# Patient Record
Sex: FEMALE | Race: BLACK OR AFRICAN AMERICAN | NOT HISPANIC OR LATINO | Employment: FULL TIME | URBAN - METROPOLITAN AREA
[De-identification: names, ages, dates, MRNs, and addresses within clinical notes are randomized per-mention and may not be internally consistent; named-entity substitution may affect disease eponyms.]

---

## 2023-11-09 ENCOUNTER — OFFICE VISIT (OUTPATIENT)
Dept: OBSTETRICS AND GYNECOLOGY | Facility: CLINIC | Age: 48
End: 2023-11-09
Payer: COMMERCIAL

## 2023-11-09 DIAGNOSIS — N76.0 ACUTE VAGINITIS: ICD-10-CM

## 2023-11-09 DIAGNOSIS — Z00.00 HEALTHCARE MAINTENANCE: Primary | ICD-10-CM

## 2023-11-09 PROCEDURE — 99214 OFFICE O/P EST MOD 30 MIN: CPT | Performed by: OBSTETRICS & GYNECOLOGY

## 2023-11-09 RX ORDER — TERCONAZOLE 8 MG/G
1 CREAM VAGINAL NIGHTLY
Qty: 20 G | Refills: 2 | Status: SHIPPED | OUTPATIENT
Start: 2023-11-09 | End: 2023-11-12

## 2023-11-09 RX ORDER — TERCONAZOLE 8 MG/G
1 CREAM VAGINAL NIGHTLY
Qty: 20 G | Refills: 0 | Status: SHIPPED | OUTPATIENT
Start: 2023-11-09 | End: 2023-11-09 | Stop reason: SDUPTHER

## 2023-11-09 ASSESSMENT — ENCOUNTER SYMPTOMS
ALLERGIC/IMMUNOLOGIC NEGATIVE: 0
MUSCULOSKELETAL NEGATIVE: 0
ENDOCRINE NEGATIVE: 0
GASTROINTESTINAL NEGATIVE: 0
PSYCHIATRIC NEGATIVE: 0
NEUROLOGICAL NEGATIVE: 0
CARDIOVASCULAR NEGATIVE: 0
CONSTITUTIONAL NEGATIVE: 0
HEMATOLOGIC/LYMPHATIC NEGATIVE: 0
EYES NEGATIVE: 0
RESPIRATORY NEGATIVE: 0

## 2023-11-09 NOTE — PROGRESS NOTES
Subjective   Patient ID: David Manrique is a 47 y.o. female who presents for Vaginal Discharge (Patient here for vaginal discharge,last pap  wnl HPV negative, no chaperone needed).  Vaginal Discharge  The patient's primary symptoms include vaginal discharge.     Patient is a 47-year-old female  3 para 2 new to the office here for evaluation of vaginal yeast.  Chronic.  Previously saw Dr. Yassine Kaba and Dr. Brar  She denies any urinary or bowel symptoms.  Review of Systems   Genitourinary:  Positive for vaginal discharge.       Objective   Physical Exam  Pelvic exam: External genitalia Bartholin's urethra and [place]'s normal.  Vaginal vault with watery white discharge.  Cervix not friable.  On bimanual exam the uterus is small mobile nontender no adnexal masses    Wet prep significant for yeast forms.  Negative clue cells and negative trichomonads  Assessment/Plan   Yeast vaginitis chronic.  We will treat with Terazol  Encouraged use of probiotics  Follow-up in 2months for annual exam

## 2023-11-09 NOTE — PROGRESS NOTES
Subjective   Patient ID: David Manrique is a 47 y.o. female who presents for Vaginal Discharge (Patient here for vaginal discharge,last pap  wnl HPV negative, no chaperone needed).  HPI  Patient is a 47-year-old female  3 para 2 here for an initial visit and evaluation of chronic yeast vaginitis.  Previously seen by Dr. Yasisne Kaba.  And Doctor Zonia.  Chronic yeast vaginitis.  Treated mostly with topicals as she had fever blisters from the Diflucan.  She denies any urinary or bowel symptoms.  Her last Pap smear was in  benign  Review of Systems    Objective   Physical Exam  Pelvic exam: External genitalia Bartholin's urethra and [place]'s normal.  Vaginal vault with thin white discharge.  On bimanual exam the uterus is small mobile nontender no adnexal masses    Wet prep performed with no clue cells.  Few yeast forms.  No trichomonas  Assessment/Plan   Yeast vaginitis we will treat with Terazol.  Refills given  Encouraged her to begin probiotics.  Follow-up for annual exam in 3 to 4 months       Subjective   David Manrique is a 47 y.o. female who complains of vaginal discharge/itch.    HPI:  Symptoms reported: {symptoms:02808}  Onset: {onset:64366}  Course: {Desc; intermittent/persistent/constant:95391}  Progression: {resolved/improved/worsened:12359}    Helpful treatments: {home treatments:73679}  Unhelpful treatments tried: {home treatments:33600}    Objective   Physical Exam:   General Appearance: {Exam; general:01066}  Abdomen: {Exam; abdomen:22113}  Pelvic Exam: {exam; pelvic:70469}  Urine dipstick: {:5113}.    Assessment/Plan   Diagnoses and all orders for this visit:  Healthcare maintenance  -     Follow Up In Gynecology; Future  -     terconazole (Terazol 3) 0.8 % vaginal cream; Insert 1 applicator into the vagina once daily at bedtime for 3 days.  Acute vaginitis  -     Vaginitis Gram Stain For Bacterial Vaginosis + Yeast; Future

## 2024-01-17 ENCOUNTER — APPOINTMENT (OUTPATIENT)
Dept: PRIMARY CARE | Facility: CLINIC | Age: 49
End: 2024-01-17
Payer: COMMERCIAL

## 2024-01-18 ENCOUNTER — OFFICE VISIT (OUTPATIENT)
Dept: OBSTETRICS AND GYNECOLOGY | Facility: CLINIC | Age: 49
End: 2024-01-18
Payer: COMMERCIAL

## 2024-01-18 VITALS
SYSTOLIC BLOOD PRESSURE: 115 MMHG | HEIGHT: 64 IN | WEIGHT: 190 LBS | BODY MASS INDEX: 32.44 KG/M2 | DIASTOLIC BLOOD PRESSURE: 74 MMHG

## 2024-01-18 DIAGNOSIS — Z78.0 MENOPAUSE: ICD-10-CM

## 2024-01-18 DIAGNOSIS — Z12.31 ENCOUNTER FOR SCREENING MAMMOGRAM FOR BREAST CANCER: ICD-10-CM

## 2024-01-18 DIAGNOSIS — Z00.00 ENCOUNTER FOR ANNUAL PHYSICAL EXAMINATION EXCLUDING GYNECOLOGICAL EXAMINATION IN A PATIENT OLDER THAN 17 YEARS: Primary | ICD-10-CM

## 2024-01-18 LAB
POC BLOOD, URINE: NEGATIVE
POC GLUCOSE, URINE: NEGATIVE MG/DL
POC KETONES, URINE: NEGATIVE MG/DL
POC LEUKOCYTES, URINE: NEGATIVE
POC NITRITE,URINE: NEGATIVE
POC PROTEIN, URINE: ABNORMAL MG/DL

## 2024-01-18 PROCEDURE — 99396 PREV VISIT EST AGE 40-64: CPT | Performed by: OBSTETRICS & GYNECOLOGY

## 2024-01-18 PROCEDURE — 81003 URINALYSIS AUTO W/O SCOPE: CPT | Performed by: OBSTETRICS & GYNECOLOGY

## 2024-01-18 RX ORDER — ESTRADIOL 1 MG/1
1 TABLET ORAL DAILY
Qty: 30 TABLET | Refills: 1 | Status: SHIPPED | OUTPATIENT
Start: 2024-01-18 | End: 2024-02-19 | Stop reason: SDUPTHER

## 2024-01-18 ASSESSMENT — ENCOUNTER SYMPTOMS
RESPIRATORY NEGATIVE: 0
HEMATOLOGIC/LYMPHATIC NEGATIVE: 0
CONSTITUTIONAL NEGATIVE: 0
LOSS OF SENSATION IN FEET: 0
MUSCULOSKELETAL NEGATIVE: 0
GASTROINTESTINAL NEGATIVE: 0
ALLERGIC/IMMUNOLOGIC NEGATIVE: 0
EYES NEGATIVE: 0
NEUROLOGICAL NEGATIVE: 0
ENDOCRINE NEGATIVE: 0
OCCASIONAL FEELINGS OF UNSTEADINESS: 0
PSYCHIATRIC NEGATIVE: 0
CARDIOVASCULAR NEGATIVE: 0
DEPRESSION: 0

## 2024-01-18 ASSESSMENT — LIFESTYLE VARIABLES
SKIP TO QUESTIONS 9-10: 0
HOW MANY STANDARD DRINKS CONTAINING ALCOHOL DO YOU HAVE ON A TYPICAL DAY: 1 OR 2
HOW OFTEN DO YOU HAVE SIX OR MORE DRINKS ON ONE OCCASION: LESS THAN MONTHLY
AUDIT-C TOTAL SCORE: 2
HOW OFTEN DO YOU HAVE A DRINK CONTAINING ALCOHOL: MONTHLY OR LESS

## 2024-01-18 ASSESSMENT — PAIN SCALES - GENERAL: PAINLEVEL: 0-NO PAIN

## 2024-01-18 ASSESSMENT — PATIENT HEALTH QUESTIONNAIRE - PHQ9
SUM OF ALL RESPONSES TO PHQ9 QUESTIONS 1 & 2: 0
1. LITTLE INTEREST OR PLEASURE IN DOING THINGS: NOT AT ALL
2. FEELING DOWN, DEPRESSED OR HOPELESS: NOT AT ALL

## 2024-01-18 NOTE — PROGRESS NOTES
Subjective   Patient ID: David Manrique is a 48 y.o. female who presents for Annual Exam (Annual exam last pap 22 wnl HPV negative last mammogram 10/13/22 benign no chaperone needed).  HPI  Patient is a 48-year-old female  3 para 2 known to the practice here for her annual exam.  She denies any urinary or bowel symptoms.  Does complain of occasional hot flashes poor sleep and irritability.    She also has a family history of breast cancer in 2 maternal aunts and 1 paternal aunt with ovarian cancer.  Review of Systems   All other systems reviewed and are negative.      Objective   Physical Exam  Thyroid: No thyroid megaly    Cardiovascular: Regular rate and rhythm    Lungs: Clear to auscultation    Breasts: No skin changes no masses palpated    Abdomen: Soft nontender bowel sounds positive no masses palpated    Extremities nontender no edema    Pelvic exam: External genitalia Bartholin's urethra and [place]'s are normal.  Vaginal exam shows no lesions or discharge.  Pelvic bimanual exam reveals no masses or tenderness.  Assessment/Plan     Normal annual physical exam  Menopausal symptoms affecting daily living and irritability.  Previously was on estradiol and would like to try again.  Provided her a 30-day prescription with 1 refill and she is due for follow-up virtually to reassess.  Also discussed history and multiple aunts of breast cancer and 1 ovarian cancer.  We have ordered BRCA testing.         Elie Stokes MD 24 3:40 PM

## 2024-01-25 ENCOUNTER — OFFICE VISIT (OUTPATIENT)
Dept: PRIMARY CARE | Facility: CLINIC | Age: 49
End: 2024-01-25
Payer: COMMERCIAL

## 2024-01-25 VITALS
WEIGHT: 190 LBS | HEART RATE: 88 BPM | SYSTOLIC BLOOD PRESSURE: 107 MMHG | BODY MASS INDEX: 32.44 KG/M2 | HEIGHT: 64 IN | DIASTOLIC BLOOD PRESSURE: 74 MMHG | OXYGEN SATURATION: 94 %

## 2024-01-25 DIAGNOSIS — Z13.6 SCREENING FOR HEART DISEASE: Primary | ICD-10-CM

## 2024-01-25 DIAGNOSIS — Z00.00 HEALTH CARE MAINTENANCE: ICD-10-CM

## 2024-01-25 DIAGNOSIS — Z23 NEED FOR SHINGLES VACCINE: ICD-10-CM

## 2024-01-25 DIAGNOSIS — F32.A DEPRESSION, UNSPECIFIED DEPRESSION TYPE: ICD-10-CM

## 2024-01-25 DIAGNOSIS — Z23 NEED FOR PROPHYLACTIC VACCINATION AGAINST DIPHTHERIA AND TETANUS: ICD-10-CM

## 2024-01-25 DIAGNOSIS — Z12.11 SCREEN FOR COLON CANCER: ICD-10-CM

## 2024-01-25 PROBLEM — E55.9 VITAMIN D INSUFFICIENCY: Status: ACTIVE | Noted: 2024-01-25

## 2024-01-25 LAB
ALBUMIN SERPL BCP-MCNC: 4.5 G/DL (ref 3.4–5)
ALP SERPL-CCNC: 68 U/L (ref 33–110)
ALT SERPL W P-5'-P-CCNC: 33 U/L (ref 7–45)
ANION GAP SERPL CALC-SCNC: 14 MMOL/L (ref 10–20)
AST SERPL W P-5'-P-CCNC: 22 U/L (ref 9–39)
BILIRUB SERPL-MCNC: 0.8 MG/DL (ref 0–1.2)
BUN SERPL-MCNC: 10 MG/DL (ref 6–23)
CALCIUM SERPL-MCNC: 10.2 MG/DL (ref 8.6–10.6)
CHLORIDE SERPL-SCNC: 104 MMOL/L (ref 98–107)
CHOLEST SERPL-MCNC: 137 MG/DL (ref 0–199)
CHOLESTEROL/HDL RATIO: 2.5
CO2 SERPL-SCNC: 29 MMOL/L (ref 21–32)
CREAT SERPL-MCNC: 0.75 MG/DL (ref 0.5–1.05)
EGFRCR SERPLBLD CKD-EPI 2021: >90 ML/MIN/1.73M*2
ERYTHROCYTE [DISTWIDTH] IN BLOOD BY AUTOMATED COUNT: 13.8 % (ref 11.5–14.5)
GLUCOSE SERPL-MCNC: 102 MG/DL (ref 74–99)
HCT VFR BLD AUTO: 45.7 % (ref 36–46)
HDLC SERPL-MCNC: 55.1 MG/DL
HGB BLD-MCNC: 14 G/DL (ref 12–16)
LDLC SERPL CALC-MCNC: 71 MG/DL
MCH RBC QN AUTO: 27 PG (ref 26–34)
MCHC RBC AUTO-ENTMCNC: 30.6 G/DL (ref 32–36)
MCV RBC AUTO: 88 FL (ref 80–100)
NON HDL CHOLESTEROL: 82 MG/DL (ref 0–149)
NRBC BLD-RTO: 0 /100 WBCS (ref 0–0)
PLATELET # BLD AUTO: 202 X10*3/UL (ref 150–450)
POTASSIUM SERPL-SCNC: 4.8 MMOL/L (ref 3.5–5.3)
PROT SERPL-MCNC: 7.4 G/DL (ref 6.4–8.2)
RBC # BLD AUTO: 5.18 X10*6/UL (ref 4–5.2)
SODIUM SERPL-SCNC: 142 MMOL/L (ref 136–145)
TRIGL SERPL-MCNC: 53 MG/DL (ref 0–149)
TSH SERPL-ACNC: 0.76 MIU/L (ref 0.44–3.98)
VLDL: 11 MG/DL (ref 0–40)
WBC # BLD AUTO: 4.6 X10*3/UL (ref 4.4–11.3)

## 2024-01-25 PROCEDURE — 85027 COMPLETE CBC AUTOMATED: CPT

## 2024-01-25 PROCEDURE — 1036F TOBACCO NON-USER: CPT | Performed by: INTERNAL MEDICINE

## 2024-01-25 PROCEDURE — 80061 LIPID PANEL: CPT

## 2024-01-25 PROCEDURE — 36415 COLL VENOUS BLD VENIPUNCTURE: CPT

## 2024-01-25 PROCEDURE — 84443 ASSAY THYROID STIM HORMONE: CPT

## 2024-01-25 PROCEDURE — 99396 PREV VISIT EST AGE 40-64: CPT | Performed by: INTERNAL MEDICINE

## 2024-01-25 PROCEDURE — 80053 COMPREHEN METABOLIC PANEL: CPT

## 2024-01-25 PROCEDURE — 90715 TDAP VACCINE 7 YRS/> IM: CPT | Performed by: INTERNAL MEDICINE

## 2024-01-25 PROCEDURE — 90750 HZV VACC RECOMBINANT IM: CPT | Performed by: INTERNAL MEDICINE

## 2024-01-25 PROCEDURE — 90471 IMMUNIZATION ADMIN: CPT | Performed by: INTERNAL MEDICINE

## 2024-01-25 PROCEDURE — 90472 IMMUNIZATION ADMIN EACH ADD: CPT | Performed by: INTERNAL MEDICINE

## 2024-01-25 RX ORDER — MINERAL OIL
1 ENEMA (ML) RECTAL DAILY
COMMUNITY

## 2024-01-25 RX ORDER — CHOLECALCIFEROL (VITAMIN D3) 25 MCG
1000 TABLET ORAL DAILY
COMMUNITY

## 2024-01-25 ASSESSMENT — PROMIS GLOBAL HEALTH SCALE
CARRYOUT_PHYSICAL_ACTIVITIES: MOSTLY
RATE_AVERAGE_FATIGUE: MODERATE
RATE_QUALITY_OF_LIFE: VERY GOOD
RATE_MENTAL_HEALTH: EXCELLENT
RATE_GENERAL_HEALTH: VERY GOOD
EMOTIONAL_PROBLEMS: RARELY
RATE_SOCIAL_SATISFACTION: EXCELLENT
RATE_AVERAGE_PAIN: 4
RATE_PHYSICAL_HEALTH: GOOD
CARRYOUT_SOCIAL_ACTIVITIES: EXCELLENT

## 2024-01-25 ASSESSMENT — PATIENT HEALTH QUESTIONNAIRE - PHQ9
2. FEELING DOWN, DEPRESSED OR HOPELESS: NOT AT ALL
SUM OF ALL RESPONSES TO PHQ9 QUESTIONS 1 AND 2: 0
1. LITTLE INTEREST OR PLEASURE IN DOING THINGS: NOT AT ALL

## 2024-01-25 NOTE — PROGRESS NOTES
"Subjective   Patient ID: David Manrique is a 48 y.o. female who presents for Annual Exam.        HPI     Patient is a 56-year-old female with past medical history of depression who presents for wellness.  Patient follows with gynecology and is up-to-date on screenings.  She has perimenopausal symptoms and currently on estrogen replacement therapy.  She has an appointment to discuss further with the gynecologist in a month.  She lives at home with her .  She has a children out of the house.  She has a  and exercises quite regularly.  She has gained 5 pounds since last visit.    No complaints at this time.    Review of Systems  Constitutional: No fever or chills, No Night Sweats  Eyes: No Blurry Vision or Eye sight problems  ENT: No Nasal Discharge, Hoarseness, sore throat  Cardiovascular: no chest pain, no palpitations and no syncope.   Respiratory: no cough, no shortness of breath during exertion and no shortness of breath at rest.   Gastrointestinal: no abdominal pain, no nausea and no vomiting.   : No vaginal discharge, burning with urination, no blood in urine or stools  Skin: No Skin rashes or Lesions  Neuro: No Headache, no dizziness or Numbness or tingling  Psych: No Anxiety, depression or sleeping problems  Heme: No Easy bleeding or brusing.     Objective   /74   Pulse 88   Ht 1.626 m (5' 4\")   Wt 86.2 kg (190 lb)   LMP  (LMP Unknown)   SpO2 94%   BMI 32.61 kg/m²     Physical Exam  Patient declined Chaperone  Constitutional: Alert and in no acute distress. Well developed, well nourished.   Head and Face: Head and face: Normal.    Eyes: Normal external exam. Pupils were equal in size, round, reactive to light (PERRL) with normal accommodation and extraocular movements intact (EOMI).   Ears, Nose, Mouth, and Throat: External inspection of ears and nose: Normal.  Hearing: Normal.  Nasal mucosa, septum, and turbinates: Normal.  Lips, teeth, and gums: Normal.  Oropharynx: " Normal.   Neck: No neck mass was observed. Supple. Thyroid not enlarged and there were no palpable thyroid nodules.   Cardiovascular: Heart rate and rhythm were normal, normal S1 and S2. Pedal pulses: Normal. No peripheral edema.   Pulmonary: No respiratory distress. Clear bilateral breath sounds.   Breast: Normal Appearance, No Masses or lumps palpated  Abdomen: Soft nontender; no abdominal mass palpated. Normal bowel sounds. No organomegaly.   : Deferred   Musculoskeletal: No joint swelling seen, normal movements of all extremities. Range of motion: Normal.  Muscle strength/tone: Normal.    Skin: Normal skin color and pigmentation, normal skin turgor, and no rash.   Neurologic: Deep tendon reflexes were 2+ and symmetric.   Psychiatric: Judgment and insight: Intact. Mood and affect: Normal.  Lymphatic: No cervical lymphadenopathy. Palpation of lymph nodes in axillae: Normal.  Palpation of lymph nodes in groin: Normal.    Lab Results   Component Value Date    WBC 3.9 (L) 09/27/2022    HGB 14.5 09/27/2022    HCT 46.7 (H) 09/27/2022     09/27/2022    CHOL 175 09/27/2022    TRIG 80 09/27/2022    HDL 57.2 09/27/2022    ALT 24 09/27/2022    AST 17 09/27/2022     09/27/2022    K 4.1 09/27/2022     09/27/2022    CREATININE 0.70 09/27/2022    BUN 8 09/27/2022    CO2 28 09/27/2022    TSH 0.95 09/27/2022    INR 1.1 11/08/2020    HGBA1C 6.3 (A) 09/27/2022       MR elbow  MRN: [id number]  Patient Name: ROBERTO LAND     STUDY:  MRI of the  left elbow  without IV contrast;  11/9/2022 9:05 am     INDICATION:  epicondylitis  M77.12: Lateral epicondylitis of left elbow.     COMPARISON:  Left elbow radiographs 05/23/2022.     ACCESSION NUMBER(S):  [id number]     ORDERING CLINICIAN:  BEV TINOCO     TECHNIQUE:  MR imaging of the  left elbow was obtained  without IV contrast.     FINDINGS:  TENDONS:  There is mild thickening and surrounding edema of the common extensor  tendon. The biceps, triceps,  brachialis tendons as well as the common  flexor tendons are intact.     LIGAMENTS:  The major ligaments of the elbow are intact, including the ulnar  collateral, lateral ulnar collateral, and radial collateral ligaments.     JOINTS:  There is no dislocation. The articular cartilage is intact. There is  no osteochondral defect.     There is no joint effusion. There is no olecranon bursitis.     OSSEOUS STRUCTURES:  There is small oblong T2 hyperintense cystic changes within the  subchondral capitellum. There is no fracture or contusion.     SOFT TISSUES:  Musculature is normal without tear or atrophy.     The ulnar nerve is normal. The cubital tunnel is intact.     IMPRESSION:  1. Mild lateral epicondylitis.  2. Small oblong T2 hyperintense cystic changes in the capitellum,  likely subchondral cyst formation.     I personally reviewed the images/study and I agree with the findings  as stated. This study was interpreted at [facility], [place], [place].      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     Other Visit Diagnoses         Codes    Screening for heart disease    -  Primary Z13.6    Relevant Orders    CT cardiac scoring wo IV contrast    Health care maintenance     Z00.00    Relevant Orders    CBC    Comprehensive metabolic panel    Lipid Panel    TSH with reflex to Free T4 if abnormal    Follow Up In [facility] - PCP - Health Maintenance    Need for shingles vaccine     Z23    Relevant Orders    Zoster vaccine, recombinant, adult (SHINGRIX)    Need for prophylactic vaccination against diphtheria and tetanus     Z23    Relevant Orders    Tdap vaccine, age 7 years and older    Screen for colon cancer     Z12.11    Relevant Orders    Colonoscopy Screening; Average Risk Patient               Dear David Manrique     It was my pleasure to take care of you today in the office. Below are the things we discussed today:    1. 1. Immunizations: Yearly Flu shot is recommended.          a: COVID: Up-to-Date         b: Tetanus: Ordered         c: Shingrix: Ordered.  Repeat 2 months      2. Blood Work: Ordered  3. Seen your dentist twice a year  4. Yearly Eye exam is recommended    5. BMI: 32.6  6: Diet recommendations:   Eat Clean, Try to have as many home cooked meals as possible  Avoid processed foods which contain excess calories, sugar, and sodium.    7. Exercise recommendations:   150 minutes a week to maintain your weight     If you have to loose weight, you need a better diet and exercise plan.     8. Supplements recommended:  a - Calcium 600 mg up to twice a day to get a total of 1200 mg. Each 8 oz of milk or yogurt or 1 oz of cheese, 1 Banana, 1 serving of green Leafy vegetable has about 300 mg of Calcium, so you may subtract that amount. Calcium citrate is the only acceptable supplement to take if you take an acid suppressing medication like Prilosec; otherwise Calcium carbonate is acceptable too (It can cause Constipation).   b - Vitamin D - 2000 IU daily     9. Please get your Living will / Advance directive completed if you do not have one already. Please make sure our office has a copy of the latest one.     10. Colonoscopy: Ordered  11. Mammogram: Scheduled  12. PAP: Up-to-date  13. DEXA: N/A  14: Skin Check: Please see Dermatology once a year for a Skin Check.     15.    Follow up in one year for a Physical. Please call the office before your Physical to see if you need blood work completed prior to your physical.     Please call me if any questions arise from now until your next visit. I will call you after I am done seeing patients. A Doctor is always available by phone when the office is closed. Please feel free to call for help with any problem that you feel shouldn't wait until the  office re-opens.     Chidi Castañeda, DO

## 2024-01-26 ENCOUNTER — HOSPITAL ENCOUNTER (OUTPATIENT)
Dept: RADIOLOGY | Facility: CLINIC | Age: 49
Discharge: HOME | End: 2024-01-26
Payer: COMMERCIAL

## 2024-01-26 DIAGNOSIS — Z12.31 ENCOUNTER FOR SCREENING MAMMOGRAM FOR BREAST CANCER: ICD-10-CM

## 2024-01-26 PROCEDURE — 77063 BREAST TOMOSYNTHESIS BI: CPT | Performed by: RADIOLOGY

## 2024-01-26 PROCEDURE — 77067 SCR MAMMO BI INCL CAD: CPT

## 2024-01-26 PROCEDURE — 77067 SCR MAMMO BI INCL CAD: CPT | Performed by: RADIOLOGY

## 2024-01-30 ENCOUNTER — HOSPITAL ENCOUNTER (OUTPATIENT)
Dept: RADIOLOGY | Facility: HOSPITAL | Age: 49
Discharge: HOME | End: 2024-01-30
Payer: COMMERCIAL

## 2024-01-30 DIAGNOSIS — Z13.6 SCREENING FOR HEART DISEASE: ICD-10-CM

## 2024-01-30 PROCEDURE — 75571 CT HRT W/O DYE W/CA TEST: CPT

## 2024-02-19 ENCOUNTER — TELEMEDICINE (OUTPATIENT)
Dept: OBSTETRICS AND GYNECOLOGY | Facility: CLINIC | Age: 49
End: 2024-02-19
Payer: COMMERCIAL

## 2024-02-19 DIAGNOSIS — Z78.0 MENOPAUSE: ICD-10-CM

## 2024-02-19 PROCEDURE — 99213 OFFICE O/P EST LOW 20 MIN: CPT | Mod: 95 | Performed by: OBSTETRICS & GYNECOLOGY

## 2024-02-19 PROCEDURE — 99213 OFFICE O/P EST LOW 20 MIN: CPT | Performed by: OBSTETRICS & GYNECOLOGY

## 2024-02-19 RX ORDER — TERCONAZOLE 8 MG/G
1 CREAM VAGINAL NIGHTLY
Qty: 20 G | Refills: 2 | Status: SHIPPED | OUTPATIENT
Start: 2024-02-19 | End: 2024-02-22

## 2024-02-19 RX ORDER — ESTRADIOL 1 MG/1
1 TABLET ORAL DAILY
Qty: 90 TABLET | Refills: 3 | Status: SHIPPED | OUTPATIENT
Start: 2024-02-19 | End: 2025-02-18

## 2024-02-19 NOTE — PROGRESS NOTES
Subjective   Patient ID: David Manrique is a 48 y.o. female who presents for No chief complaint on file..  HPI  Patient calling for a telehealth visit to review hormone replacement therapy.  Previous hysterectomy and started on estradiol daily for menopausal symptoms and hot flashes.  Feels much improved and would like to continue.  Reassured her that without a uterus she had no risk for uterine cancer.  Did discuss the increased risk for DVT and then if for any reason she became sedentary may want to stop her estradiol.  She very much would like to continue it at this time    Also complains of recurrent yeast infection.  Has used over-the-counter medicine.  Would like some treatment    Also had ordered her BRCA testing which the paperwork was not filled out completely and she will return  exam deferred due to telehealth visitit for completeness  Review of Systems   All other systems reviewed and are negative.      Objective   Physical Exam    Assessment/Plan   Patient well on estrogen replacement therapy.    Diflucan for recurrent yeast    Follow-up to complete BRCA order sheet         Elie Stokes MD 02/19/24 3:53 PM

## 2024-07-29 ENCOUNTER — CLINICAL SUPPORT (OUTPATIENT)
Dept: PRIMARY CARE | Facility: CLINIC | Age: 49
End: 2024-07-29
Payer: COMMERCIAL

## 2024-07-29 PROCEDURE — 90471 IMMUNIZATION ADMIN: CPT | Performed by: INTERNAL MEDICINE

## 2024-07-29 PROCEDURE — 90750 HZV VACC RECOMBINANT IM: CPT | Performed by: INTERNAL MEDICINE

## 2024-10-07 ENCOUNTER — OFFICE VISIT (OUTPATIENT)
Dept: PRIMARY CARE | Facility: CLINIC | Age: 49
End: 2024-10-07
Payer: COMMERCIAL

## 2024-10-07 VITALS
BODY MASS INDEX: 32.27 KG/M2 | SYSTOLIC BLOOD PRESSURE: 127 MMHG | HEART RATE: 78 BPM | DIASTOLIC BLOOD PRESSURE: 73 MMHG | OXYGEN SATURATION: 96 % | WEIGHT: 188 LBS

## 2024-10-07 DIAGNOSIS — E66.9 OBESITY (BMI 30-39.9): ICD-10-CM

## 2024-10-07 DIAGNOSIS — R06.83 SNORING: ICD-10-CM

## 2024-10-07 DIAGNOSIS — M62.89 QUADRICEP TIGHTNESS: ICD-10-CM

## 2024-10-07 DIAGNOSIS — Z23 NEEDS FLU SHOT: ICD-10-CM

## 2024-10-07 DIAGNOSIS — R53.83 OTHER FATIGUE: Primary | ICD-10-CM

## 2024-10-07 PROCEDURE — 99214 OFFICE O/P EST MOD 30 MIN: CPT | Performed by: INTERNAL MEDICINE

## 2024-10-07 PROCEDURE — 1036F TOBACCO NON-USER: CPT | Performed by: INTERNAL MEDICINE

## 2024-10-07 PROCEDURE — 90656 IIV3 VACC NO PRSV 0.5 ML IM: CPT | Performed by: INTERNAL MEDICINE

## 2024-10-07 PROCEDURE — 90471 IMMUNIZATION ADMIN: CPT | Performed by: INTERNAL MEDICINE

## 2024-10-07 NOTE — PROGRESS NOTES
Subjective   Patient ID: David Manrique is a 48 y.o. female who presents for Leg Pain.    HPI     Patient is a 48-year-old female with past medical history of hot flashes who presents for follow-up.  She has been experiencing some right-sided knee pain that begins in the right hip and radiates down the lateral aspect of the right leg towards the knee.  She states that she has some tenderness in the bilateral thighs.  No swelling.  No muscle weakness.  She does have a  who believes it is related to the quads    She is also experiencing ongoing fatigue.  She has significant amount of snoring and obesity.  She states that she had a sleep study done previously but no record.        Review of Systems  Constitutional: No fever or chills  Cardiovascular: no chest pain, no palpitations and no syncope.   Respiratory: no cough, no shortness of breath during exertion and no shortness of breath at rest.   Gastrointestinal: no abdominal pain, no nausea and no vomiting.  Neuro: No Headache, no dizziness    Objective   /73   Pulse 78   Wt 85.3 kg (188 lb)   SpO2 96%   BMI 32.27 kg/m²     Physical Exam  Constitutional: Alert and in no acute distress. Well developed, well nourished  Head and Face: Head and face: Normal.    Cardiovascular: Heart rate and rhythm were normal, normal S1 and S2. No peripheral edema.   Pulmonary: No respiratory distress. Clear bilateral breath sounds.  Musculoskeletal: Gait and station: Normal. Muscle strength/tone: Normal.   Skin: Normal skin color and pigmentation, normal skin turgor, and no rash.    Psychiatric: Judgment and insight: Intact. Mood and affect: Normal.    Procedures    Lab Results   Component Value Date    WBC 4.6 01/25/2024    HGB 14.0 01/25/2024    HCT 45.7 01/25/2024     01/25/2024    CHOL 137 01/25/2024    TRIG 53 01/25/2024    HDL 55.1 01/25/2024    ALT 33 01/25/2024    AST 22 01/25/2024     01/25/2024    K 4.8 01/25/2024     01/25/2024  "   CREATININE 0.75 01/25/2024    BUN 10 01/25/2024    CO2 29 01/25/2024    TSH 0.76 01/25/2024    INR 1.1 11/08/2020    HGBA1C 6.3 (A) 09/27/2022       CT cardiac scoring wo IV contrast  Narrative: Interpreted By:  Rosey Daniels,   STUDY:  CT CARDIAC SCORING WO IV CONTRAST;  1/30/2024 8:42 am      INDICATION:  Signs/Symptoms:screen cad.      COMPARISON:  11/08/2020      ACCESSION NUMBER(S):  [id number]      ORDERING CLINICIAN:  ZHANNA HARGROVE      TECHNIQUE:  Using prospective ECG gating, CT scan of the coronary arteries was  performed without intravenous contrast. Coronary calcium scoring  was  performed according to the method of Agatston.      FINDINGS:  The score and distribution of calcium in the coronary arteries is as  follows:      LM ,  LAD ,  LCx ,  RCA ,      Total 0      The visualized mid/lower ascending thoracic aorta measures 2.8 cm in  diameter. The heart is normal in size. No pericardial effusion is  present.      No gross evidence of mediastinal or hilar lymphadenopathy or masses  is identified. The visualized segments of the lungs are normally  expanded.      The visualized subdiaphragmatic structures appear intact.      Impression: 1. Coronary artery calcium score of 0*.      *Coronary artery calcium scoring may be helpful in predicting the  risk for future coronary heart disease events.  According to the  American College [employer] Foundation Clinical Expert Consensus  Task Force, such testing provides important prognostic information in  patients with more than one coronary heart disease risk factor. The  coronary artery calcium score correlates with the annual risk of a  non-fatal myocardial infarction or coronary heart disease death.      Coronary artery score            Annual Risk      0-99                             0.4%  100-399                        1.3%  >400                            2.4%      These three \"breakpoints\" correspond to lower, intermediate and high  risk states for " future coronary events.  Such information should be  used, along with appropriate clinical judgment, to make decisions  regarding the intensity of risk factor management strategies to treat  blood lipids and to modify other non-lipid coronary risk factors.      Reference: Steve P et al. Circulation.  2007; 115:402-426      MACRO:  None      Signed by: Rosey Daniels 1/30/2024 9:22 AM  Dictation workstation:   [id number]            Assessment/Plan   Problem List Items Addressed This Visit    None  Visit Diagnoses         Codes    Other fatigue    -  Primary R53.83    Relevant Orders    Home sleep apnea test (HSAT)    Snoring     R06.83    Relevant Orders    Home sleep apnea test (HSAT)    Obesity (BMI 30-39.9)     E66.9    Relevant Orders    Home sleep apnea test (HSAT)    Quadricep tightness     M62.89    Relevant Orders    Referral to Physical Therapy    Needs flu shot     Z23    Relevant Orders    Flu vaccine, trivalent, preservative free, age 6 months and greater (Fluarix/Fluzone/Flulaval)          Given the ongoing snoring obesity and fatigue would need to rule out obstructive sleep apnea.  Will order home sleep study.    With regards to the knee pain is likely related to the quadriceps.  Will refer to physical therapy.  Encouraged weight reduction efforts and continuing with physical therapy

## 2024-10-14 ENCOUNTER — TELEPHONE (OUTPATIENT)
Dept: PRIMARY CARE | Facility: CLINIC | Age: 49
End: 2024-10-14
Payer: COMMERCIAL

## 2024-10-14 DIAGNOSIS — E66.9 OBESITY (BMI 30-39.9): Primary | ICD-10-CM

## 2024-11-01 ENCOUNTER — CLINICAL SUPPORT (OUTPATIENT)
Dept: SLEEP MEDICINE | Facility: CLINIC | Age: 49
End: 2024-11-01
Payer: COMMERCIAL

## 2024-11-01 DIAGNOSIS — R53.83 FATIGUE, UNSPECIFIED TYPE: Primary | ICD-10-CM

## 2024-11-11 DIAGNOSIS — G47.33 OSA (OBSTRUCTIVE SLEEP APNEA): Primary | ICD-10-CM

## 2024-11-19 ENCOUNTER — APPOINTMENT (OUTPATIENT)
Dept: SLEEP MEDICINE | Facility: CLINIC | Age: 49
End: 2024-11-19
Payer: COMMERCIAL

## 2024-11-19 VITALS
SYSTOLIC BLOOD PRESSURE: 130 MMHG | WEIGHT: 187.38 LBS | RESPIRATION RATE: 16 BRPM | OXYGEN SATURATION: 97 % | TEMPERATURE: 97.5 F | HEIGHT: 64 IN | DIASTOLIC BLOOD PRESSURE: 81 MMHG | BODY MASS INDEX: 31.99 KG/M2 | HEART RATE: 70 BPM

## 2024-11-19 DIAGNOSIS — Z72.821 INADEQUATE SLEEP HYGIENE: ICD-10-CM

## 2024-11-19 DIAGNOSIS — E66.811 OBESITY (BMI 30.0-34.9): ICD-10-CM

## 2024-11-19 DIAGNOSIS — G47.33 OBSTRUCTIVE SLEEP APNEA: Primary | ICD-10-CM

## 2024-11-19 PROCEDURE — 99214 OFFICE O/P EST MOD 30 MIN: CPT | Performed by: PHYSICIAN ASSISTANT

## 2024-11-19 PROCEDURE — 1036F TOBACCO NON-USER: CPT | Performed by: PHYSICIAN ASSISTANT

## 2024-11-19 PROCEDURE — 3008F BODY MASS INDEX DOCD: CPT | Performed by: PHYSICIAN ASSISTANT

## 2024-11-19 SDOH — ECONOMIC STABILITY: FOOD INSECURITY: WITHIN THE PAST 12 MONTHS, YOU WORRIED THAT YOUR FOOD WOULD RUN OUT BEFORE YOU GOT MONEY TO BUY MORE.: NEVER TRUE

## 2024-11-19 SDOH — ECONOMIC STABILITY: FOOD INSECURITY: WITHIN THE PAST 12 MONTHS, THE FOOD YOU BOUGHT JUST DIDN'T LAST AND YOU DIDN'T HAVE MONEY TO GET MORE.: NEVER TRUE

## 2024-11-19 ASSESSMENT — LIFESTYLE VARIABLES
SKIP TO QUESTIONS 9-10: 1
HOW MANY STANDARD DRINKS CONTAINING ALCOHOL DO YOU HAVE ON A TYPICAL DAY: 1 OR 2
HOW OFTEN DO YOU HAVE SIX OR MORE DRINKS ON ONE OCCASION: NEVER
HOW OFTEN DO YOU HAVE A DRINK CONTAINING ALCOHOL: MONTHLY OR LESS
AUDIT-C TOTAL SCORE: 1

## 2024-11-19 ASSESSMENT — PAIN SCALES - GENERAL: PAINLEVEL_OUTOF10: 0-NO PAIN

## 2024-11-19 ASSESSMENT — COLUMBIA-SUICIDE SEVERITY RATING SCALE - C-SSRS
6. HAVE YOU EVER DONE ANYTHING, STARTED TO DO ANYTHING, OR PREPARED TO DO ANYTHING TO END YOUR LIFE?: NO
2. HAVE YOU ACTUALLY HAD ANY THOUGHTS OF KILLING YOURSELF?: NO
1. IN THE PAST MONTH, HAVE YOU WISHED YOU WERE DEAD OR WISHED YOU COULD GO TO SLEEP AND NOT WAKE UP?: NO

## 2024-11-19 ASSESSMENT — ENCOUNTER SYMPTOMS
DEPRESSION: 0
OCCASIONAL FEELINGS OF UNSTEADINESS: 0
LOSS OF SENSATION IN FEET: 0

## 2024-11-19 NOTE — PATIENT INSTRUCTIONS
[facility] Sleep Medicine  [facility] [address] [address]  [address] [facility] [address]       NAME: David Manrique   DATE: 11/19/24    Your Sleep Provider Today: Tricia Wiggins PA-C  Your Primary Care Physician: Chidi Castañeda, DO   Your Referring Provider: No ref. provider found    DIAGNOSIS:   No diagnosis found.    Thank you for coming to the Sleep Medicine Clinic today! Your sleep medicine provider today was: Tricia Wiggins PA-C Below is a summary of your treatment plan, other important information, and our contact numbers:      TREATMENT PLAN     FOR QUESTIONS AND CONCERNS:   1. In case of difficulties with PAP device or mask interface, please FIRST contact your DME        (If using [employer]: [phone])  2. For questions concerning SLEEP CLINIC appointments, please call [phone].  3. Regarding SLEEP LAB scheduling, please call [phone] or [phone]    Obstructive Sleep Apnea (RYAN) is a disorder characterized by recurrent apneas during sleep despite efforts to breath. It is due to upper airway obstruction. These respiratory pauses may induce high levels of carbon dioxide or low oxygen levels. Cardiac arrhythmia and elevation of blood pressure in the body and the lungs may occur. Frequent partial arousals occur during a nights sleep resulting in sleep deprivation and daytime sleepiness. It has been associated with an increased risk of cardiovascular disease, stroke, hypertension, and insulin resistance.    RECOMMENDATIONS to help your sleep apnea include: losing weight if overweight, avoidance of sleeping on your back, avoidance of alcohol 2-3 hours before be      As we discussed, you have sleep apnea. We will order an CPAP for you which will set you up with your new PAP at home. This will be done by a [employer] (DME).    **Bring all equipment with you to follow-up appointments.**     **You will need to be seen day  "31-90 days after CPAP set up.**    Insurance expects 4+ hours of use at least 70% of the time.         *Additional Tips*  *You can look at CPAP.com to view different mask styles  - You may be able to swap masks with your PAP vendor within 30 days without being charged for a new mask from the time you receive your PAP device. You should take the advantage of this offer if you have a hard time finding the right mask, as there are many mask options. Please do not get discouraged if you do not like the first one!  - You must clean your PAP device regularly per instructions from your PAP vendor.    *Common Issues and Solutions*  Pillow is dislodging mask - Consider getting a CPAP pillow or switching to a mask with the hose at the top.    Dry Mouth - Adjust Humidity and/or try Biotene Gel.    Leak - Please call your equipment provider (i.e. [employer]) as they may need to adjust your mask.    Difficulty tolerating the PAP mask - Contact your equipment company to try a different mask, we can order a \"mini sleep study\" with the sleep tech to try different masks/settings of pressure.      Additional Resources: American Academy of Sleep Medicine http://sleepeducation.org; or National Sleep Foundation: https://sleepfoundation.org    Refturn to clinic 4 month      IMPORTANT INFORMATION     Call 911 for medical emergencies.  Our offices are generally open from Monday-Friday, 9 am - 5 pm.  If you need to get in touch with me, you may either call me and my team(number is below) or you can use [employer].  If a referral for a test, for CPAP, or for another specialist was made, and you have not heard about scheduling this within a week, please call scheduling at [phone]).  If you are unable to make your appointment for clinic or an overnight study, kindly call the office at least 48 hours in advance to cancel and reschedule.  If you are on CPAP, please bring your device's card or the device to each clinic " appointment.   There are no supporting services by either the sleep doctors or their staff on weekends and Holidays, or after 5 PM on weekdays.   If you have been asked to come to a sleep study, make sure you bring toiletries, a comfy pillow, and any nighttime medications that you may regularly take. Also be sure to eat dinner before you arrive. We generally do not provide meals.      PRESCRIPTIONS     We require 7 days advanced notice for prescription refills. If we do not receive the request in this time, we cannot guarantee that your medication will be refilled in time.      IMPORTANT PHONE NUMBERS     Sleep Medicine Clinic Fax: [phone]  Appointments (for Adult Sleep Clinic): [phone] - option 2  Appointments (For Sleep Studies): [phone] - option 3  [employer] (DME): [phone]  Behavioral Sleep Medicine: [phone]  Sleep Surgery: [phone]  ENT (Otolaryngology): [phone]  Headache Clinic (Neurology): [phone]  Neurology: [phone]  Psychiatry: [phone]  Pulmonary Function Testing (PFT) Center: [phone]  Pulmonary Medicine: [phone]    [employer] ([employer]): [phone]  [employer] (DME): [phone]  [facility] ([facility] [place] [phone]      OUR ADULT SLEEP MEDICINE TEAM   Please do not hesitate to call the office or sleep nurse with any questions between appointments:    Adult Sleep Nurses (Roselyn Penaloza, FLORENTIN and Laura Underwood RN):  For clinical questions and refilling prescriptions: [phone]  Email sleep diaries and other documents at: [email or web address]    Adult Sleep Medicine Secretaries:  Janine Bauer (For Patricia/Chen/Krise/Strohl/Yecolby/Brennon):   P: [phone]  F: [phone]  Sarah Zuniga (For [place]): P: [phone]  Fax: [phone]  Rocio Turk (For Jurcevic/Blank): P: [phone]  F: [phone]  Marta Solis (For [place]): P: [phone]  F:  [phone]  Jami Hernán (For Tricia/Diego/Zacharbelary): P: [phone]  F: [phone]  Chaparrita Sullivan (For Mathew/Gennaro): P: [phone]  F: [phone]     Adult Sleep Medicine Advanced Practice Providers:  Barron Naidu ([place], [place])  Alaina Cortez ([facility])  Fariha Mcmahon CNP ([place], [place], [place])  Zayar Wiggins CNP ([place], [place], [place])  Bernadine Burdick CNP ([place], [place])        OUR SLEEP TESTING LOCATIONS     Our team will contact you to schedule your sleep study, however, you can contact us as follow:  Main Phone Line (scheduling only): [phone]), option 3  Adult and Pediatric Locations  [facility] (6 years and older): [facility] - 4th floor ([address]) After hours line: [phone]  [facility] (Main campus: All ages): [facility], 6th floor. After hours line: [phone]   [place] (5 years and older; younger considered on case-by-case basis): [address], [address]. Scheduling  After hours line: [phone]   [place] (6 years and older): [address]; [address]; [address]   [place] (6 years and older): [address], [address]  After hours line: [phone]   [place] (13 years and older) in [place]: [address], 2nd floor  After hours line: [phone]   [place] (13 year and older): [address]  After hours line: [phone]     Adult Only Locations:   [place] (18 years and older): [address] [facility], 2nd floor   [place] (18 years and older): [address]; 4th floor  After hours line: [phone]  [facility] (18 years and older) at [place]: [address]  After hours line: [phone]          CONTACTING YOUR SLEEP MEDICINE PROVIDER     Send a message directly to your provider through \"My Chart\", which is the email service through your  Records " "Account: [email or web address]   Call [phone] and leave a message. One of the administrative assistants will forward the message to your sleep medicine provider through \"My Chart\" and/or email.     Your sleep medicine provider for this visit was: Tricia Wiggins PA-C    "

## 2024-11-19 NOTE — ASSESSMENT & PLAN NOTE
Severe on testing, symptomatic  -reviewed treatment options: PAP therapy, OAT, surgical options + recommended weight loss/positional therapy in the mean anne   -Diet, exercise, and weight loss were emphasized today in clinic, as were non-supine sleep, avoiding alcohol in the late evening, and driving or operating heavy machinery when sleepy.

## 2024-11-19 NOTE — ASSESSMENT & PLAN NOTE
-reviewed eliminate electronics 1 hour before bed  -reviewed most adults need 7-9 hours of sleep; currently getting ~6 hours

## 2024-11-19 NOTE — PROGRESS NOTES
Patient: David Manrique    [id number]  : 1975 -- AGE 48 y.o.    Provider: Tricia Wiggins PA-C     Location [facility]   Service Date: 2024              [facility]  New Visit Note      HISTORY OF PRESENT ILLNESS     The patient's referring provider is: No ref. provider found; Chidi Castañeda DO    HISTORY OF PRESENT ILLNESS   David Manrique is a 48 y.o. female with h/o depression and Erb's palsy who presents to a [facility] Sleep Medicine Clinic for a sleep medicine evaluation with concerns of No chief complaint on file..     Past Sleep History  HSAT 2024  REI3%: 42.6  REI4%: 34.3  O2 sid: 67%  Mean O2: 90%      Current History    On today's visit, the patient reports snoring for several years. Reports  has witnessed pauses in her breathing. Does wake with gasping sensation at times. Does report she wakes with dry mouth, sore throat. Report nocturia 1-2 over night.   Recent HSAT as above indicates severe sleep apnea, patient here to discuss treatment options.  Does report some times tired/fatigued during the day but not excessively sleepy. Denies any concerns with driving due to sleepiness.     Does report ~6 hours overnight sleep. Does admit to use of phone/tv/reading in bed prior to bed.     Zia symptoms of RLS. Denies sleep walking,dream enactment or other parasomnia.     Sleep schedule:  In bed: 10:30P   Subjective sleep latency:  quickly - when she puts down phone/tv/book   Awakenings during night:  3 - nocturia, tossing/turning   Length of awakenings:  few minutes  Final awakening time:  6:30A  Naps: 2PM on weekends x2 hours     Overall estimate of total sleep time: 6 hours   Weekends/Days off: sleeps MN-7A     Preferred sleeping position: SLEEP POSITION: prone      ESS:  11  DAVEY:  11  FOSQ: 27      REVIEW OF SYSTEMS     REVIEW OF SYSTEMS  See HPI; all other ROS were reviewed and negative for  compliant        ALLERGIES AND MEDICATIONS     ALLERGIES  Allergies   Allergen Reactions    Bee Pollen Hives    Diflucan [Fluconazole] Other     Blisters    House Dust Other    Prochlorperazine Hallucinations, Other and Unknown    Zofran [Ondansetron Hcl] Hallucinations       MEDICATIONS  Current Outpatient Medications   Medication Sig Dispense Refill    estradiol (Estrace) 1 mg tablet Take 1 tablet (1 mg) by mouth once daily. 90 tablet 3     No current facility-administered medications for this visit.         PAST HISTORY     PAST MEDICAL HISTORY  She  has a past medical history of Personal history of gestational diabetes (01/19/2017) and Snoring.    PAST SURGICAL HISTORY:  Past Surgical History:   Procedure Laterality Date    HYSTERECTOMY  01/19/2017    Hysterectomy    OTHER SURGICAL HISTORY  01/10/2014    History Of Prior Surgery    TONSILLECTOMY  01/19/2017    Tonsillectomy       FAMILY HISTORY  Family History   Problem Relation Name Age of Onset    Breast cancer Mother's Sister  30    Breast cancer Mother's Sister  60    Ovarian cancer Father's Sister         She does not have a family history of sleep disorder.    SOCIAL HISTORY  She  reports that she has never smoked. She has never used smokeless tobacco. No history on file for alcohol use and drug use. She     Caffeine consumption: Yes, 1 cups/day  Alcohol consumption: Yes, rarely (occasional)  Marijuana: No      PHYSICAL EXAM     VITAL SIGNS: There were no vitals taken for this visit.     CURRENT WEIGHT: There were no vitals filed for this visit.  There is no height or weight on file to calculate BMI.  PREVIOUS WEIGHTS:  Wt Readings from Last 3 Encounters:   10/07/24 85.3 kg (188 lb)   01/25/24 86.2 kg (190 lb)   01/18/24 86.2 kg (190 lb)       Constitutional: Alert and oriented, cooperative, no acute distress  Head: Normocephalic, atraumatic   Cranial Features: No abnormal craniofacial features     Upper Airway Examination:  Mallampati Class: 3  Tonsil  stGstrstastdstest:st st1st Tongue Scalloping: present  Uvula: midline    Neck: Supple. Trachea midline.  Pulmonary: Non-labored breathing, speaks in full sentences.   Cardiac: regular rate   Extremities: No clubbing, no edema  Neuromuscular: Cranial nerves grossly intact, no focal deficits      RESULTS/DATA     Bicarbonate (mmol/L)   Date Value   01/25/2024 29   09/27/2022 28   08/19/2021 27   11/08/2020 28             ASSESSMENT/PLAN     Ms. Manrique is a 48 y.o. female and She was referred to the [facility] Sleep Medicine Clinic for evaluation of RYAN    Problem List, Orders, Assessment, Recommendations:  Problem List Items Addressed This Visit             ICD-10-CM    Obstructive sleep apnea - Primary G47.33     Severe on testing, symptomatic  -reviewed treatment options: PAP therapy, OAT, surgical options + recommended weight loss/positional therapy in the mean anne   -Diet, exercise, and weight loss were emphasized today in clinic, as were non-supine sleep, avoiding alcohol in the late evening, and driving or operating heavy machinery when sleepy.           Relevant Orders    Positive Airway Pressure (PAP) Therapy    Inadequate sleep hygiene Z72.821     -reviewed eliminate electronics 1 hour before bed  -reviewed most adults need 7-9 hours of sleep; currently getting ~6 hours          Obesity (BMI 30.0-34.9) E66.811     -discussed relationship to RYAN  -encourage healthy diet/exercise/weight loss             Disposition    Return to clinic in 3 months  -has appointment scheduled already with Dr. Qureshi (referred to him by her PCP), but happened to get in with me sooner to get process going. Discussed she can keep that appointment and continue to follow with either one of us for future visit. Will contact me if any questions/issues with getting started with pap therapy.

## 2024-12-04 ASSESSMENT — ENCOUNTER SYMPTOMS
CHILLS: 1
COUGH: 1
RHINORRHEA: 1
SORE THROAT: 1
MYALGIAS: 1
HEADACHES: 1
FEVER: 1

## 2024-12-05 ENCOUNTER — APPOINTMENT (OUTPATIENT)
Dept: PHYSICAL THERAPY | Facility: CLINIC | Age: 49
End: 2024-12-05
Payer: COMMERCIAL

## 2024-12-05 ENCOUNTER — OFFICE VISIT (OUTPATIENT)
Dept: PRIMARY CARE | Facility: CLINIC | Age: 49
End: 2024-12-05
Payer: COMMERCIAL

## 2024-12-05 VITALS
OXYGEN SATURATION: 94 % | BODY MASS INDEX: 31.41 KG/M2 | HEART RATE: 89 BPM | SYSTOLIC BLOOD PRESSURE: 126 MMHG | WEIGHT: 183 LBS | DIASTOLIC BLOOD PRESSURE: 89 MMHG | TEMPERATURE: 98.4 F

## 2024-12-05 DIAGNOSIS — J06.9 ACUTE URI: Primary | ICD-10-CM

## 2024-12-05 PROCEDURE — 1036F TOBACCO NON-USER: CPT | Performed by: INTERNAL MEDICINE

## 2024-12-05 PROCEDURE — 99213 OFFICE O/P EST LOW 20 MIN: CPT | Performed by: INTERNAL MEDICINE

## 2024-12-05 PROCEDURE — 87635 SARS-COV-2 COVID-19 AMP PRB: CPT

## 2024-12-05 NOTE — PROGRESS NOTES
Subjective   Patient ID: David Manrique is a 49 y.o. female who presents for Sinusitis, Headache, Cough, and Sore Throat.    HPI     Patient is a 49-year-old female who presents chief complaint of sinus pressure congestion going on for 4 days.  She states that today has been the best day so far.  She feels like the symptoms are improving.  She had a fever initially 4 days ago but that has self terminated.  No shortness of breath or productive sputum.  No recent sick contacts    Review of Systems  Constitutional: No fever or chills  Cardiovascular: no chest pain, no palpitations and no syncope.   Respiratory: no cough, no shortness of breath during exertion and no shortness of breath at rest.   Gastrointestinal: no abdominal pain, no nausea and no vomiting.  Neuro: No Headache, no dizziness    Objective   /89   Pulse 89   Temp 36.9 °C (98.4 °F)   Wt 83 kg (183 lb)   SpO2 94%   BMI 31.41 kg/m²     Physical Exam  Constitutional: Alert and in no acute distress. Well developed, well nourished  Head and Face: Head and face: Normal.    Cardiovascular: Heart rate and rhythm were normal, normal S1 and S2. No peripheral edema.   Pulmonary: No respiratory distress. Clear bilateral breath sounds.  Musculoskeletal: Gait and station: Normal. Muscle strength/tone: Normal.   Skin: Normal skin color and pigmentation, normal skin turgor, and no rash.    Psychiatric: Judgment and insight: Intact. Mood and affect: Normal.    Procedures    Lab Results   Component Value Date    WBC 4.6 01/25/2024    HGB 14.0 01/25/2024    HCT 45.7 01/25/2024     01/25/2024    CHOL 137 01/25/2024    TRIG 53 01/25/2024    HDL 55.1 01/25/2024    ALT 33 01/25/2024    AST 22 01/25/2024     01/25/2024    K 4.8 01/25/2024     01/25/2024    CREATININE 0.75 01/25/2024    BUN 10 01/25/2024    CO2 29 01/25/2024    TSH 0.76 01/25/2024    INR 1.1 11/08/2020    HGBA1C 6.3 (A) 09/27/2022       CT cardiac scoring wo IV  "contrast  Narrative: Interpreted By:  Rosey Daniels,   STUDY:  CT CARDIAC SCORING WO IV CONTRAST;  1/30/2024 8:42 am      INDICATION:  Signs/Symptoms:screen cad.      COMPARISON:  11/08/2020      ACCESSION NUMBER(S):  [id number]      ORDERING CLINICIAN:  ZHANNA HARGROVE      TECHNIQUE:  Using prospective ECG gating, CT scan of the coronary arteries was  performed without intravenous contrast. Coronary calcium scoring  was  performed according to the method of Agatston.      FINDINGS:  The score and distribution of calcium in the coronary arteries is as  follows:      LM ,  LAD ,  LCx ,  RCA ,      Total 0      The visualized mid/lower ascending thoracic aorta measures 2.8 cm in  diameter. The heart is normal in size. No pericardial effusion is  present.      No gross evidence of mediastinal or hilar lymphadenopathy or masses  is identified. The visualized segments of the lungs are normally  expanded.      The visualized subdiaphragmatic structures appear intact.      Impression: 1. Coronary artery calcium score of 0*.      *Coronary artery calcium scoring may be helpful in predicting the  risk for future coronary heart disease events.  According to the  American College of Cardiology Foundation Clinical Expert Consensus  Task Force, such testing provides important prognostic information in  patients with more than one coronary heart disease risk factor. The  coronary artery calcium score correlates with the annual risk of a  non-fatal myocardial infarction or coronary heart disease death.      Coronary artery score            Annual Risk      0-99                             0.4%  100-399                        1.3%  >400                            2.4%      These three \"breakpoints\" correspond to lower, intermediate and high  risk states for future coronary events.  Such information should be  used, along with appropriate clinical judgment, to make decisions  regarding the intensity of risk factor management " strategies to treat  blood lipids and to modify other non-lipid coronary risk factors.      Reference: Steve P et al. Circulation.  2007; 115:402-426      MACRO:  None      Signed by: Rosey Daniels 1/30/2024 9:22 AM  Dictation workstation:   [id number]            Assessment/Plan   Problem List Items Addressed This Visit    None  Visit Diagnoses         Codes    Acute URI    -  Primary J06.9    Relevant Orders    Sars-CoV-2 PCR        Probable resolving upper respiratory tract infection.  Will rule out SARS COVID-19.  Continue with symptomatic management with ibuprofen Tylenol and Afrin nasal spray as needed.  Considering the symptoms are improving less likely to be bacterial sinusitis.  Will call with results of testing.  Call our office if symptoms are getting worse in the next 48 hours

## 2024-12-05 NOTE — Clinical Note
December 5, 2024     Patient: David Manrique   YOB: 1975   Date of Visit: 12/5/2024       To Whom It May Concern:    David Manrique was seen in my clinic on 12/5/2024 at 10:00 am. Please excuse David for her absence from work on this day to make the appointment.    If you have any questions or concerns, please don't hesitate to call.         Sincerely,         Chidi Castañeda DO        CC: No Recipients

## 2024-12-06 LAB — SARS-COV-2 RNA RESP QL NAA+PROBE: DETECTED

## 2025-01-24 NOTE — PROGRESS NOTES
Patient is seen at the request of Dr. Chidi Castañeda for my opinion regarding Weight Management. My final recommendations will be communicated back to the requesting provider by way of shared medical record.    NEW  Subjective   David Manrique is a 49 y.o. female with a hx of RYAN, Erb's palsy, depression, pre-DM, h/o gest diabetes, snoring, hysterectomy, tonsillectomy who presents for weight management and obesity.    1. Weight history: Has struggled with weight for the last 4 years.       Previous weight loss attempts: None    2. Sleep:  Has RYAN- uses Cpap        3. Stress: Low, , , 8 kids, 5 grandchildren      4. Exercise: Incorporating consistently- weight training 2 days a week        5. Appetite control: Stable  Obesity medication: N/A    Diet Recall-  Breakfast- yogurt with blueberries, granola, marvin seeds and honey/ banana with 2 slices of toast/garcia and egg   Lunch- popcorn/grapes/wings (snaking)  Dinner- chicken breasts with rice and green beans/air fried wings with broccoli and mashed potatoes/spaghetti with garlic bread  Daily Snacks- chips/chocolates/granola bars/apples with honey  Beverages- 1 cup of coffee daily/water  Alcohol- no     Any personal history of pancreatitis?: No  Any personal or family history of medullary thyroid cancer or MEN (multiple endocrine neoplasia)?: No  Any history of kidney stones? No  Any history of glaucoma? No      Current Outpatient Medications:     estradiol (Estrace) 1 mg tablet, Take 1 tablet (1 mg) by mouth once daily., Disp: 90 tablet, Rfl: 3    ROS:  System: normal  Eyes : no visual changes  Neck : no tenderness, no new lumps/bumps  Respiratory : no SOB  Cardiovascular : no chest pain, no palpitations  Gastro-Intestinal : no abdominal concerns  Neurological : no numbness or tingling in the extremities  Musculoskeletal : no joint paint, no muscle pain  Skin : no unusual rashes  Psychiatric : no depression, no anxiety  See HPI for Endocrine  "TONIE    Past Medical History:   Diagnosis Date    Allergic 11/23/1980    Personal history of gestational diabetes 01/19/2017    History of gestational diabetes mellitus (GDM)    Snoring     Snoring       Past Surgical History:   Procedure Laterality Date    HYSTERECTOMY  01/19/2017    Hysterectomy    OTHER SURGICAL HISTORY  01/10/2014    History Of Prior Surgery    TONSILLECTOMY  01/19/2017    Tonsillectomy       Social History     Socioeconomic History    Marital status:      Spouse name: Not on file    Number of children: Not on file    Years of education: Not on file    Highest education level: Not on file   Occupational History    Not on file   Tobacco Use    Smoking status: Never    Smokeless tobacco: Never   Substance and Sexual Activity    Alcohol use: Yes     Alcohol/week: 1.0 standard drink of alcohol     Types: 1 Glasses of wine per week    Drug use: Never    Sexual activity: Yes     Partners: Male     Birth control/protection: None   Other Topics Concern    Not on file   Social History Narrative    Not on file     Social Drivers of Health     Financial Resource Strain: Not on file   Food Insecurity: No Food Insecurity (11/19/2024)    Hunger Vital Sign     Worried About Running Out of Food in the Last Year: Never true     Ran Out of Food in the Last Year: Never true   Transportation Needs: Not on file   Physical Activity: Not on file   Stress: No Stress Concern Present (1/18/2024)    [employer] - Occupational Stress Questionnaire     Feeling of Stress : Not at all   Social Connections: Not on file   Intimate Partner Violence: Not on file   Housing Stability: Not on file       Objective    Physical Exam:  Blood pressure 128/74, pulse 67, temperature 36.4 °C (97.5 °F), temperature source Oral, height 1.626 m (5' 4\"), weight 87 kg (191 lb 14.4 oz).  General : alert and oriented X3, no acute distress  Eyes : EOMI   Neck : non tender, supple  Thyroid : no palpable " nodules  Heart : regular rate and rhythm  ABD : no obvious abnormalities, soft to palpation  Extremities : no edema  Neuro : normal  BMI: 32.94kg/m2    Assessment/Plan   David Manrique is a 49 y.o. female with a hx of RYAN, Erb's palsy, depression, pre-DM, h/o gest diabetes, snoring, hysterectomy, tonsillectomy who presents for weight management and obesity.    1. Weight Management : Reviewed the principles of energy metabolism, caloric intake and expenditure, and rationale for a treatment program.  Also reinforced need for reduced calorie, low fat diet and increased physical activity.    We reviewed the possibility of starting an interdisciplinary lifestyle intervention program involving improvement of the diet, a personalized exercise program, efforts to reduce the stress and the possibility of using appetite suppressant medications in an effort to help with the weight loss process.  The patient expressed interest in the plan.    2. Nutrition : I discussed trying one of the diet approaches we have here in the program : Mediterranean lifestyle, ketosis diet.  The patient will be referred to the Registered Dietician for education on their diet of choice.    1/27/25: 191.9lb, 32.94kg/m2    3. Sleep: RYAN on CPAP    4. Stress: , , 8 kids, 5 grandchildren     5. Exercise: Incorporating consistently- weight training 2 days a week     6. Appetite control: high      Discussed Obesity medication: discussed AOM's  1/27/25: far8j=2.9%  She would like to try Zepbound - wrote for 2.5mg/wk.  She said her insurance covers this.    Follow up in a group visit.  Genie Fontenot MD

## 2025-01-27 ENCOUNTER — APPOINTMENT (OUTPATIENT)
Dept: ENDOCRINOLOGY | Facility: CLINIC | Age: 50
End: 2025-01-27
Payer: COMMERCIAL

## 2025-01-27 ENCOUNTER — TELEPHONE (OUTPATIENT)
Dept: ENDOCRINOLOGY | Facility: CLINIC | Age: 50
End: 2025-01-27

## 2025-01-27 VITALS
DIASTOLIC BLOOD PRESSURE: 74 MMHG | TEMPERATURE: 97.5 F | WEIGHT: 191.9 LBS | BODY MASS INDEX: 32.76 KG/M2 | SYSTOLIC BLOOD PRESSURE: 128 MMHG | HEIGHT: 64 IN | HEART RATE: 67 BPM

## 2025-01-27 DIAGNOSIS — E66.9 OBESITY (BMI 30-39.9): ICD-10-CM

## 2025-01-27 DIAGNOSIS — Z76.89 ENCOUNTER FOR WEIGHT MANAGEMENT: ICD-10-CM

## 2025-01-27 LAB — POC HEMOGLOBIN A1C: 5.9 % (ref 4.2–6.5)

## 2025-01-27 PROCEDURE — 3008F BODY MASS INDEX DOCD: CPT | Performed by: INTERNAL MEDICINE

## 2025-01-27 PROCEDURE — 83036 HEMOGLOBIN GLYCOSYLATED A1C: CPT | Performed by: INTERNAL MEDICINE

## 2025-01-27 PROCEDURE — 99204 OFFICE O/P NEW MOD 45 MIN: CPT | Performed by: INTERNAL MEDICINE

## 2025-01-29 ENCOUNTER — PHARMACY VISIT (OUTPATIENT)
Dept: PHARMACY | Facility: CLINIC | Age: 50
End: 2025-01-29
Payer: MEDICARE

## 2025-01-29 PROCEDURE — RXMED WILLOW AMBULATORY MEDICATION CHARGE

## 2025-02-05 DIAGNOSIS — Z12.11 COLON CANCER SCREENING: ICD-10-CM

## 2025-02-05 RX ORDER — SODIUM, POTASSIUM,MAG SULFATES 17.5-3.13G
SOLUTION, RECONSTITUTED, ORAL ORAL
Qty: 1 EACH | Refills: 0 | Status: SHIPPED | OUTPATIENT
Start: 2025-02-05

## 2025-02-14 ENCOUNTER — HOSPITAL ENCOUNTER (OUTPATIENT)
Dept: RADIOLOGY | Facility: CLINIC | Age: 50
Discharge: HOME | End: 2025-02-14
Payer: COMMERCIAL

## 2025-02-14 VITALS — HEIGHT: 64 IN | BODY MASS INDEX: 32.61 KG/M2 | WEIGHT: 191 LBS

## 2025-02-14 DIAGNOSIS — Z12.31 SCREENING MAMMOGRAM FOR BREAST CANCER: ICD-10-CM

## 2025-02-14 PROCEDURE — 77067 SCR MAMMO BI INCL CAD: CPT | Performed by: STUDENT IN AN ORGANIZED HEALTH CARE EDUCATION/TRAINING PROGRAM

## 2025-02-14 PROCEDURE — 77063 BREAST TOMOSYNTHESIS BI: CPT | Performed by: STUDENT IN AN ORGANIZED HEALTH CARE EDUCATION/TRAINING PROGRAM

## 2025-02-14 PROCEDURE — 77063 BREAST TOMOSYNTHESIS BI: CPT

## 2025-02-24 ENCOUNTER — PHARMACY VISIT (OUTPATIENT)
Dept: PHARMACY | Facility: CLINIC | Age: 50
End: 2025-02-24
Payer: MEDICARE

## 2025-02-24 ENCOUNTER — APPOINTMENT (OUTPATIENT)
Dept: SLEEP MEDICINE | Facility: CLINIC | Age: 50
End: 2025-02-24
Payer: COMMERCIAL

## 2025-02-24 VITALS
TEMPERATURE: 98.5 F | BODY MASS INDEX: 33.13 KG/M2 | HEART RATE: 80 BPM | SYSTOLIC BLOOD PRESSURE: 121 MMHG | WEIGHT: 193 LBS | OXYGEN SATURATION: 95 % | DIASTOLIC BLOOD PRESSURE: 85 MMHG

## 2025-02-24 DIAGNOSIS — G47.33 OBSTRUCTIVE SLEEP APNEA: Primary | ICD-10-CM

## 2025-02-24 DIAGNOSIS — G47.33 OSA (OBSTRUCTIVE SLEEP APNEA): ICD-10-CM

## 2025-02-24 DIAGNOSIS — F51.12 INSUFFICIENT SLEEP SYNDROME: ICD-10-CM

## 2025-02-24 DIAGNOSIS — E66.811 OBESITY (BMI 30.0-34.9): ICD-10-CM

## 2025-02-24 DIAGNOSIS — Z72.821 INADEQUATE SLEEP HYGIENE: ICD-10-CM

## 2025-02-24 PROCEDURE — RXMED WILLOW AMBULATORY MEDICATION CHARGE

## 2025-02-24 PROCEDURE — 1036F TOBACCO NON-USER: CPT | Performed by: STUDENT IN AN ORGANIZED HEALTH CARE EDUCATION/TRAINING PROGRAM

## 2025-02-24 PROCEDURE — 99214 OFFICE O/P EST MOD 30 MIN: CPT | Performed by: STUDENT IN AN ORGANIZED HEALTH CARE EDUCATION/TRAINING PROGRAM

## 2025-02-24 ASSESSMENT — SLEEP AND FATIGUE QUESTIONNAIRES
WORRIED_DISTRESSED_DUE_TO_SLEEP: SOMEWHAT
HOW LIKELY ARE YOU TO NOD OFF OR FALL ASLEEP WHILE LYING DOWN TO REST IN THE AFTERNOON WHEN CIRCUMSTANCES PERMIT: HIGH CHANCE OF DOZING
SATISFACTION_WITH_CURRENT_SLEEP_PATTERN: SATISFIED
SLEEP_PROBLEM_INTERFERES_DAILY_ACTIVITIES: SOMEWHAT
WAKING_TOO_EARLY: MILD
HOW LIKELY ARE YOU TO NOD OFF OR FALL ASLEEP WHILE SITTING AND READING: MODERATE CHANCE OF DOZING
ESS-CHAD TOTAL SCORE: 11
HOW LIKELY ARE YOU TO NOD OFF OR FALL ASLEEP IN A CAR, WHILE STOPPED FOR A FEW MINUTES IN TRAFFIC: WOULD NEVER DOZE
SITING INACTIVE IN A PUBLIC PLACE LIKE A CLASS ROOM OR A MOVIE THEATER: WOULD NEVER DOZE
HOW LIKELY ARE YOU TO NOD OFF OR FALL ASLEEP WHEN YOU ARE A PASSENGER IN A CAR FOR AN HOUR WITHOUT A BREAK: MODERATE CHANCE OF DOZING
HOW LIKELY ARE YOU TO NOD OFF OR FALL ASLEEP WHILE SITTING AND TALKING TO SOMEONE: WOULD NEVER DOZE
SLEEP_PROBLEM_NOTICEABLE_TO_OTHERS: SOMEWHAT
HOW LIKELY ARE YOU TO NOD OFF OR FALL ASLEEP WHILE WATCHING TV: SLIGHT CHANCE OF DOZING
HOW LIKELY ARE YOU TO NOD OFF OR FALL ASLEEP WHILE SITTING QUIETLY AFTER LUNCH WITHOUT ALCOHOL: HIGH CHANCE OF DOZING

## 2025-02-24 ASSESSMENT — PAIN SCALES - GENERAL: PAINLEVEL_OUTOF10: 0-NO PAIN

## 2025-02-24 ASSESSMENT — ENCOUNTER SYMPTOMS
PSYCHIATRIC NEGATIVE: 1
RESPIRATORY NEGATIVE: 1
CONSTITUTIONAL NEGATIVE: 1
NEUROLOGICAL NEGATIVE: 1
CARDIOVASCULAR NEGATIVE: 1

## 2025-02-24 NOTE — ASSESSMENT & PLAN NOTE
BMI Readings from Last 1 Encounters:   02/24/25 33.13 kg/m²     - encouraged healthy weight loss via diet and exercise

## 2025-02-24 NOTE — PROGRESS NOTES
Patient: David Manrique    [id number]  : 1975 -- AGE 49 y.o.    Provider: Randy Qureshi MD     Location [facility]   Service Date: 2025              [facility]  Followup Visit Note        ASSESSMENT/PLAN     Ms. Manrique is a 49 y.o. female and she returns in followup to the [facility] for the problems listed below on 25     Problem List, Orders, Assessment, Recommendations:  Problem List Items Addressed This Visit             ICD-10-CM    Obstructive sleep apnea - Primary G47.33     - doing well with PAP therapy  - continue current setting  - renew PAP supply orders           Inadequate sleep hygiene Z72.821     -reviewed eliminate electronics 1 hour before bed  -reviewed most adults need 7-9 hours of sleep; currently getting ~6 hours          Obesity (BMI 30.0-34.9) E66.811     BMI Readings from Last 1 Encounters:   25 33.13 kg/m²     - encouraged healthy weight loss via diet and exercise           Insufficient sleep syndrome F51.12     Encouraged sleep extension    May be the reason for her some residual daytime sleepiness now          Other Visit Diagnoses         Codes    RYAN (obstructive sleep apnea)     G47.33    Relevant Orders    Follow Up In Adult Sleep Medicine          Disposition  Return to clinic in 12 months to see ZOEY Wiggins       HISTORY OF PRESENT ILLNESS     HISTORY OF PRESENT ILLNESS   David Manrique is a 49 y.o. female with h/o RYAN and Obesity who presents to a [facility] for followup.     Assessment and plan from last visit: 2024    Ms. Manrique is a 48 y.o. female and She was referred to the [facility] for evaluation of RYAN     Problem List, Orders, Assessment, Recommendations:  Problem List Items Addressed This Visit               ICD-10-CM     Obstructive sleep apnea - Primary G47.33       Severe on  testing, symptomatic  -reviewed treatment options: PAP therapy, OAT, surgical options + recommended weight loss/positional therapy in the mean anne   -Diet, exercise, and weight loss were emphasized today in clinic, as were non-supine sleep, avoiding alcohol in the late evening, and driving or operating heavy machinery when sleepy.              Relevant Orders     Positive Airway Pressure (PAP) Therapy     Inadequate sleep hygiene Z72.821       -reviewed eliminate electronics 1 hour before bed  -reviewed most adults need 7-9 hours of sleep; currently getting ~6 hours            Obesity (BMI 30.0-34.9) E66.811       -discussed relationship to RYAN  -encourage healthy diet/exercise/weight loss           Current History    On today's visit, the patient reports that she has been doing great with PAP therapy.  She found her fatigue/sleepiness issue much better than before.  She sometimes still get sleepy but she also admits to only about 6 hours of sleep a night.  She often falls asleep on the couch in front of her TV.    PAP Info  DURABLE MEDICAL EQUIPMENT COMPANY: MEDICAL SERVICE [employer]  Issues with therapy: ISSUES WITH THERAPY: None  Benefits with PAP: PERCEIVED BENEFITS OF PAP: refreshing sleep    PAP Adherence  A PAP adherence download was obtained and data was reviewed personally today in clinic.    RLS Followup:   none.    Daytime Symptoms    Patient reports DAYTIME SYMPTOMS: excessively sleepy during the day  Patient denies daytime symptoms including: Denies: feeling sleepy when driving    Naps: No  Fatigue: denies feeling fatigue    ESS: 11  DAVEY: 8  FOSQ: 32    REVIEW OF SYSTEMS     REVIEW OF SYSTEMS  Review of Systems   Constitutional: Negative.    HENT: Negative.     Respiratory: Negative.     Cardiovascular: Negative.    Genitourinary: Negative.    Skin: Negative.    Neurological: Negative.    Psychiatric/Behavioral: Negative.           ALLERGIES AND MEDICATIONS     ALLERGIES  Allergies   Allergen Reactions     Bee Pollen Hives    Diflucan [Fluconazole] Other     Blisters    House Dust Other    Prochlorperazine Hallucinations, Other and Unknown    Zofran [Ondansetron Hcl] Hallucinations       MEDICATIONS: She has a current medication list which includes the following prescription(s): sodium,potassium,mag sulfates - Take one bottle beginning at 6pm night before procedure and then take the other bottle 5 hours before procedure time as directed per instruction sheet, tirzepatide (weight loss) - Inject 2.5 mg under the skin every 7 days, and estradiol - Take 1 tablet (1 mg) by mouth once daily.    PAST MEDICAL HISTORY : She  has a past medical history of Allergic (11/23/1980), Personal history of gestational diabetes (01/19/2017), Snoring, and Vitamin D deficiency.    PAST SURGICAL HISTORY: She  has a past surgical history that includes Other surgical history (01/10/2014); Tonsillectomy (01/19/2017); and Hysterectomy (01/19/2017).     FAMILY HISTORY: No changes since previous visit. Otherwise non-contributory as charted.     SOCIAL HISTORY  She  reports that she has never smoked. She has never used smokeless tobacco. She reports current alcohol use of about 1.0 standard drink of alcohol per week. She reports that she does not use drugs.       PHYSICAL EXAM     VITAL SIGNS: /85   Pulse 80   Temp 36.9 °C (98.5 °F) (Temporal)   Wt 87.5 kg (193 lb)   LMP  (LMP Unknown)   SpO2 95%   BMI 33.13 kg/m²      PREVIOUS WEIGHTS:  Wt Readings from Last 3 Encounters:   02/24/25 87.5 kg (193 lb)   02/14/25 86.6 kg (191 lb)   01/27/25 87 kg (191 lb 14.4 oz)         RESULTS/DATA     Bicarbonate (mmol/L)   Date Value   01/25/2024 29   09/27/2022 28   08/19/2021 27   11/08/2020 28

## 2025-02-25 ENCOUNTER — APPOINTMENT (OUTPATIENT)
Dept: GASTROENTEROLOGY | Facility: EXTERNAL LOCATION | Age: 50
End: 2025-02-25
Payer: COMMERCIAL

## 2025-02-25 DIAGNOSIS — Z86.0101 HISTORY OF ADENOMATOUS POLYP OF COLON: ICD-10-CM

## 2025-02-25 DIAGNOSIS — Z86.0100 PERSONAL HISTORY OF COLON POLYPS, UNSPECIFIED: Primary | ICD-10-CM

## 2025-02-25 DIAGNOSIS — Z12.11 SPECIAL SCREENING FOR MALIGNANT NEOPLASMS, COLON: ICD-10-CM

## 2025-02-25 DIAGNOSIS — Z86.0100 PERSONAL HISTORY OF COLON POLYPS, UNSPECIFIED: ICD-10-CM

## 2025-02-25 PROCEDURE — G0105 COLORECTAL SCRN; HI RISK IND: HCPCS | Performed by: INTERNAL MEDICINE

## 2025-03-03 NOTE — PROGRESS NOTES
Subjective  David Manrique is a 49 y.o. female with a hx of RYAN, Erb's palsy, depression, pre-DM, h/o gest diabetes, snoring, hysterectomy, tonsillectomy who presents for weight management and obesity.    Current Plan  1. Nutrition: Mediterranean Diet     2. Sleep:  Has RYAN- uses Cpap        3. Stress: Stable     4. Exercise: Incorporating consistently- strength training and cardio        5. Appetite control: Stable  Obesity medication: Zepbound- 2.5mg weekly      6. Prior Goals: First group meeting      New Goals: Other- increase water intake     Weight trend:    Wt Readings from Last 10 Encounters:   02/24/25 87.5 kg (193 lb)   02/14/25 86.6 kg (191 lb)   01/27/25 87 kg (191 lb 14.4 oz)   12/05/24 83 kg (183 lb)   11/19/24 85 kg (187 lb 6 oz)   10/07/24 85.3 kg (188 lb)   01/25/24 86.2 kg (190 lb)   01/18/24 86.2 kg (190 lb)   02/10/23 84.4 kg (186 lb)   09/27/22 84.8 kg (187 lb)         Current Outpatient Medications:     estradiol (Estrace) 1 mg tablet, Take 1 tablet (1 mg) by mouth once daily., Disp: 90 tablet, Rfl: 3    sodium,potassium,mag sulfates (Suprep) 17.5-3.13-1.6 gram solution, Take one bottle beginning at 6pm night before procedure and then take the other bottle 5 hours before procedure time as directed per instruction sheet, Disp: 1 each, Rfl: 0    tirzepatide, weight loss, (Zepbound) 2.5 mg/0.5 mL injection, Inject 2.5 mg under the skin every 7 days., Disp: 2 mL, Rfl: 1    ROS:  System: normal  Eyes : no visual changes  Neck : no tenderness, no new lumps/bumps  Respiratory : no SOB  Cardiovascular : no chest pain, no palpitations  Gastro-Intestinal : no abdominal concerns  Neurological : no numbness or tingling in the extremities  Musculoskeletal : no joint paint, no muscle pain  Skin : no unusual rashes  Psychiatric : no depression, no anxiety  See [facility] for Endocrine ROS    Past Medical History:   Diagnosis Date    Allergic 11/23/1980    Personal history of gestational diabetes 01/19/2017     History of gestational diabetes mellitus (GDM)    Snoring     Snoring    Vitamin D deficiency        Past Surgical History:   Procedure Laterality Date    HYSTERECTOMY  01/19/2017    Hysterectomy    OTHER SURGICAL HISTORY  01/10/2014    History Of Prior Surgery    TONSILLECTOMY  01/19/2017    Tonsillectomy       Social History     Socioeconomic History    Marital status:      Spouse name: Not on file    Number of children: Not on file    Years of education: Not on file    Highest education level: Not on file   Occupational History    Not on file   Tobacco Use    Smoking status: Never    Smokeless tobacco: Never   Substance and Sexual Activity    Alcohol use: Yes     Alcohol/week: 1.0 standard drink of alcohol     Types: 1 Glasses of wine per week    Drug use: Never    Sexual activity: Yes     Partners: Male     Birth control/protection: None   Other Topics Concern    Not on file   Social History Narrative    Not on file     Social Drivers of Health     Financial Resource Strain: Not on file   Food Insecurity: No Food Insecurity (11/19/2024)    Hunger Vital Sign     Worried About Running Out of Food in the Last Year: Never true     Ran Out of Food in the Last Year: Never true   Transportation Needs: Not on file   Physical Activity: Not on file   Stress: No Stress Concern Present (1/18/2024)    [employer] - Occupational Stress Questionnaire     Feeling of Stress : Not at all   Social Connections: Not on file   Intimate Partner Violence: Not on file   Housing Stability: Not on file       Objective      Physical Exam:  There were no vitals taken for this visit.  General : alert and oriented X3, no acute distress  Eyes : EOMI   BMI: 32.13kg/m2    Assessment/Plan  David Manrique is a 49 y.o. female with a hx of RYAN, Erb's palsy, depression, pre-DM, h/o gest diabetes, snoring, hysterectomy, tonsillectomy who presents for weight management and obesity.     1. Weight Management :  Reviewed the principles of energy metabolism, caloric intake and expenditure, and rationale for a treatment program.  Also reinforced need for reduced calorie, low fat diet and increased physical activity.     We reviewed the possibility of starting an interdisciplinary lifestyle intervention program involving improvement of the diet, a personalized exercise program, efforts to reduce the stress and the possibility of using appetite suppressant medications in an effort to help with the weight loss process.  The patient expressed interest in the plan.     2. Nutrition : I discussed trying one of the diet approaches we have here in the program : Mediterranean lifestyle, ketosis diet.  The patient will be referred to the Registered Dietician for education on their diet of choice.     1/27/25: 191.9lb, 32.94kg/m2  3/4/25: 187.2lb, 32.13kg/m2     3. Sleep: RYAN on CPAP     4. Stress: , , 8 kids, 5 grandchildren      5. Exercise: Incorporating consistently- weight training 2 days a week      6. Appetite control: high      Discussed Obesity medication: discussed AOM's  1/27/25: aeh7v=1.9%  On Zepbound 2.5mg/wk --> will up to 5mg/wk.    7. Goal: to up her water.     Follow up in a group visit.  Genie Fontenot MD

## 2025-03-04 ENCOUNTER — APPOINTMENT (OUTPATIENT)
Dept: ENDOCRINOLOGY | Facility: CLINIC | Age: 50
End: 2025-03-04
Payer: COMMERCIAL

## 2025-03-04 VITALS
HEIGHT: 64 IN | WEIGHT: 187.2 LBS | TEMPERATURE: 96.8 F | DIASTOLIC BLOOD PRESSURE: 75 MMHG | BODY MASS INDEX: 31.96 KG/M2 | HEART RATE: 79 BPM | SYSTOLIC BLOOD PRESSURE: 112 MMHG

## 2025-03-04 DIAGNOSIS — E66.811 CLASS 1 OBESITY: Primary | ICD-10-CM

## 2025-03-04 DIAGNOSIS — G47.33 OBSTRUCTIVE SLEEP APNEA: ICD-10-CM

## 2025-03-04 DIAGNOSIS — E66.9 OBESITY (BMI 30-39.9): ICD-10-CM

## 2025-03-04 PROCEDURE — 3008F BODY MASS INDEX DOCD: CPT | Performed by: INTERNAL MEDICINE

## 2025-03-04 PROCEDURE — 99215 OFFICE O/P EST HI 40 MIN: CPT | Performed by: INTERNAL MEDICINE

## 2025-03-11 ENCOUNTER — APPOINTMENT (OUTPATIENT)
Dept: ENDOCRINOLOGY | Facility: CLINIC | Age: 50
End: 2025-03-11
Payer: COMMERCIAL

## 2025-03-11 VITALS — WEIGHT: 188 LBS | BODY MASS INDEX: 32.1 KG/M2 | HEIGHT: 64 IN

## 2025-03-11 DIAGNOSIS — E66.9 OBESITY (BMI 30-39.9): ICD-10-CM

## 2025-03-11 DIAGNOSIS — Z71.3 DIETARY COUNSELING: Primary | ICD-10-CM

## 2025-03-11 PROCEDURE — 97802 MEDICAL NUTRITION INDIV IN: CPT | Performed by: DIETITIAN, REGISTERED

## 2025-03-11 NOTE — PROGRESS NOTES
"Initial Nutrition Assessment    Patient was referred to nutrition by Dr. Sammy Fontenot for weight management/desire to lose weight. Other PMHX significant for RYAN, Erb's Palsey and Pre-DM. Pertinent labs reviewed which show recent A1c of 5.9%.     Diet recall reveals a consistent meal pattern with rare missed meals; however, reported intakes of some possible larger portions and calorically dense foods all likely contributing to lack of desired weight loss/contributing to weight gain over time. Fluids mostly meeting recommendations in type and amount with water as primary beverage; however, on low end of recommendations and pt would likely benefit from increasing intakes for hydration needs. Pt is incorporating consistent physical activity, meeting weekly recommendations. See all interventions/recommendations below as discussed during visit this day.      Patient reported symptoms: Difficulty losing weight    Anthropometrics:  Height:   Ht Readings from Last 1 Encounters:   03/04/25 1.626 m (5' 4\")      Weight:   Wt Readings from Last 10 Encounters:   03/04/25 84.9 kg (187 lb 3.2 oz)   02/24/25 87.5 kg (193 lb)   02/14/25 86.6 kg (191 lb)   01/27/25 87 kg (191 lb 14.4 oz)   12/05/24 83 kg (183 lb)   11/19/24 85 kg (187 lb 6 oz)   10/07/24 85.3 kg (188 lb)   01/25/24 86.2 kg (190 lb)   01/18/24 86.2 kg (190 lb)   02/10/23 84.4 kg (186 lb)      Current BMI:   BMI Readings from Last 1 Encounters:   03/04/25 32.13 kg/m²        Labs:  Lab Results   Component Value Date    HGBA1C 5.9 01/27/2025      Lab Results   Component Value Date    CHOL 137 01/25/2024    CHOL 175 09/27/2022    CHOL 144 08/19/2021     Lab Results   Component Value Date    HDL 55.1 01/25/2024    HDL 57.2 09/27/2022    HDL 53.0 08/19/2021     Lab Results   Component Value Date    LDLCALC 71 01/25/2024     Lab Results   Component Value Date    TRIG 53 01/25/2024    TRIG 80 09/27/2022    TRIG 55 08/19/2021       Malnutrition Screening:  Significant " Unintentional weight loss: No  Eating less than 75% of usual intake for more than 2 weeks: No  Potential Signs of Inflammation: No    Recommended Malnutrition Diagnosis: No malnutrition identified    Diet Recall-  Breakfast- veggie omelet with 1 slice toast OR no toast at all OR fruit OR English muffin with honey drizzle  Lunch- chicken nuggets and tater tots OR salad with tuna   Dinner- various proteins, starches and vegetables   Daily Snacks- popcorn, chips and humus and olives, fruit, potato chips   Beverages- water throughout the day (32 ounces)  Alcohol- special occasions only  Physical Activity- PT twice weekly for resistance training and line dancing twice weekly    Other pertinent patient reported Information:  - Pt currently taking medication Zepbound with not yet full appetite suppression    Nutrition Diagnosis: Food and nutrition-related knowledge deficit related to lack of recent exposure to information as evidenced by BMI above normative standard for age and gender.    Readiness to Learn:  Cognitive ability: Alert and oriented  Motivation to learn: Interested  Family Support: Unable to assess- family not present  Instruction provided to: Patient  Patient learns best by: Multiple methods  Factors affecting learning: None   Physical limitations affecting learning: None    Education Materials Provided:   Your Mediterranean Meal Plan Booklet    Nutrition Interventions/Recommendations for 3/11/2025:  - Please refer to your book entitled: Your Mediterranean Meal Plan, and follow Mediterranean Diet eating guidelines as reviewed.  - The Healthy Plate style of eating can be a helpful tool for incorporating healthy balanced meals in appropriate portions. (Healthy Plate: Start with a 9-inch diameter plate. Fill 1/2 the plate with non-starchy vegetables, 1/4 of the plate with whole grains or starchy vegetables, and 1/4 of the plate with a lean source of protein.   - Please aim for a source of healthy protein and  fiber rich foods at meals as discussed for nutrition needs as well as to help provide better satiety at meals.   - Consider pre-planning healthy meals for the week. Refer to your book for both menu and recipe ideas to get you started.  - Incorporate strategies of mindful eating every day. Practice staying in tune with your body's hunger cues and eat only when truly hungry. Avoid emotional eating/eating when not hungry.  - Aim for 48-64 ounces of water daily.  - Keep up the great work with your weekly exercise.  - Follow-up as scheduled for the group classes with Dr. Sammy Fontenot.  - Follow-up with nutrition in April or May.      Nutrition Monitoring & Evaluation: adherence to recommendations and patient stated goals    Need for follow-up: As scheduled for Dr. Marroquin's Shared Medical Appointment (SMA) and nutrition in April or May    Referred by: Dr. Sammy Fontenot    MNT Billing Type: Medical Nutrition Assessment, each 15 min increment, for 2 increments.    SIGNATURE:   Katiuska Sommer RD, CSCHARO, LD, [facility]                                                        DATE:   3/11/2025

## 2025-03-11 NOTE — PATIENT INSTRUCTIONS
- Please refer to your book entitled: Your Mediterranean Meal Plan, and follow Mediterranean Diet eating guidelines as reviewed.  - The Healthy Plate style of eating can be a helpful tool for incorporating healthy balanced meals in appropriate portions. (Healthy Plate: Start with a 9-inch diameter plate. Fill 1/2 the plate with non-starchy vegetables, 1/4 of the plate with whole grains or starchy vegetables, and 1/4 of the plate with a lean source of protein.   - Please aim for a source of healthy protein and fiber rich foods at meals as discussed for nutrition needs as well as to help provide better satiety at meals.   - Consider pre-planning healthy meals for the week. Refer to your book for both menu and recipe ideas to get you started.  - Incorporate strategies of mindful eating every day. Practice staying in tune with your body's hunger cues and eat only when truly hungry. Avoid emotional eating/eating when not hungry.  - Aim for 48-64 ounces of water daily.  - Keep up the great work with your weekly exercise.  - Follow-up as scheduled for the group classes with Dr. Sammy Fontenot.  - Follow-up with nutrition in April or May.

## 2025-04-02 DIAGNOSIS — Z78.0 MENOPAUSE: ICD-10-CM

## 2025-04-03 PROCEDURE — RXMED WILLOW AMBULATORY MEDICATION CHARGE

## 2025-04-04 ENCOUNTER — APPOINTMENT (OUTPATIENT)
Dept: PRIMARY CARE | Facility: CLINIC | Age: 50
End: 2025-04-04
Payer: COMMERCIAL

## 2025-04-04 ENCOUNTER — PHARMACY VISIT (OUTPATIENT)
Dept: PHARMACY | Facility: CLINIC | Age: 50
End: 2025-04-04
Payer: MEDICARE

## 2025-04-04 VITALS
DIASTOLIC BLOOD PRESSURE: 74 MMHG | OXYGEN SATURATION: 97 % | HEART RATE: 75 BPM | BODY MASS INDEX: 31.58 KG/M2 | SYSTOLIC BLOOD PRESSURE: 114 MMHG | WEIGHT: 184 LBS

## 2025-04-04 DIAGNOSIS — Z00.00 HEALTH CARE MAINTENANCE: Primary | ICD-10-CM

## 2025-04-04 DIAGNOSIS — Z23 NEED FOR PROPHYLACTIC VACCINATION AGAINST STREPTOCOCCUS PNEUMONIAE (PNEUMOCOCCUS): ICD-10-CM

## 2025-04-04 PROCEDURE — 90677 PCV20 VACCINE IM: CPT | Performed by: INTERNAL MEDICINE

## 2025-04-04 PROCEDURE — 1036F TOBACCO NON-USER: CPT | Performed by: INTERNAL MEDICINE

## 2025-04-04 PROCEDURE — 99396 PREV VISIT EST AGE 40-64: CPT | Performed by: INTERNAL MEDICINE

## 2025-04-04 PROCEDURE — 90471 IMMUNIZATION ADMIN: CPT | Performed by: INTERNAL MEDICINE

## 2025-04-04 ASSESSMENT — PROMIS GLOBAL HEALTH SCALE
RATE_PHYSICAL_HEALTH: GOOD
EMOTIONAL_PROBLEMS: RARELY
CARRYOUT_SOCIAL_ACTIVITIES: EXCELLENT
RATE_SOCIAL_SATISFACTION: EXCELLENT
RATE_GENERAL_HEALTH: VERY GOOD
RATE_MENTAL_HEALTH: VERY GOOD
RATE_QUALITY_OF_LIFE: EXCELLENT
CARRYOUT_PHYSICAL_ACTIVITIES: COMPLETELY
RATE_AVERAGE_FATIGUE: MILD
RATE_AVERAGE_PAIN: 2

## 2025-04-04 NOTE — PROGRESS NOTES
Subjective   Patient ID: David Manrique is a 49 y.o. female who presents for Follow-up.          HPI     Patient is a 49-year-old female with past medical history of impaired fasting glucose and obesity presents for wellness.  No specific complaints at this time.  Following with endocrinology and nutritionist.  Currently on 5 mg of Zepbound.  Significant weight reduction.  She is happy with the results.  Significant improvement in pains in her joints.  Up-to-date on colonoscopy and mammogram.  She is due for a Pap smear and plans to schedule an appoint with her gynecologist.  Due for pneumonia vaccine    Review of Systems  Constitutional: No fever or chills, No Night Sweats  Eyes: No Blurry Vision or Eye sight problems  ENT: No Nasal Discharge, Hoarseness, sore throat  Cardiovascular: no chest pain, no palpitations and no syncope.   Respiratory: no cough, no shortness of breath during exertion and no shortness of breath at rest.   Gastrointestinal: no abdominal pain, no nausea and no vomiting.   : No vaginal discharge, burning with urination, no blood in urine or stools  Skin: No Skin rashes or Lesions  Neuro: No Headache, no dizziness or Numbness or tingling  Psych: No Anxiety, depression or sleeping problems  Heme: No Easy bleeding or brusing.     Objective   /74   Pulse 75   Wt 83.5 kg (184 lb)   LMP  (LMP Unknown)   SpO2 97%   BMI 31.58 kg/m²     Physical Exam  Constitutional: Alert and in no acute distress. Well developed, well nourished.   Head and Face: Head and face: Normal.    Eyes: Normal external exam. Pupils were equal in size, round, reactive to light (PERRL) with normal accommodation and extraocular movements intact (EOMI).   Ears, Nose, Mouth, and Throat: External inspection of ears and nose: Normal.  Hearing: Normal.  Nasal mucosa, septum, and turbinates: Normal.  Lips, teeth, and gums: Normal.  Oropharynx: Normal.   Neck: No neck mass was observed. Supple. Thyroid not enlarged and  there were no palpable thyroid nodules.   Cardiovascular: Heart rate and rhythm were normal, normal S1 and S2. Pedal pulses: Normal. No peripheral edema.   Pulmonary: No respiratory distress. Clear bilateral breath sounds.   Breast: Normal Appearance, No Masses or lumps palpated  Abdomen: Soft nontender; no abdominal mass palpated. Normal bowel sounds. No organomegaly.   : Deferred   Musculoskeletal: No joint swelling seen, normal movements of all extremities. Range of motion: Normal.  Muscle strength/tone: Normal.    Skin: Normal skin color and pigmentation, normal skin turgor, and no rash.   Neurologic: Deep tendon reflexes were 2+ and symmetric.   Psychiatric: Judgment and insight: Intact. Mood and affect: Normal.  Lymphatic: No cervical lymphadenopathy. Palpation of lymph nodes in axillae: Normal.  Palpation of lymph nodes in groin: Normal.    Lab Results   Component Value Date    WBC 4.6 01/25/2024    HGB 14.0 01/25/2024    HCT 45.7 01/25/2024     01/25/2024    CHOL 137 01/25/2024    TRIG 53 01/25/2024    HDL 55.1 01/25/2024    ALT 33 01/25/2024    AST 22 01/25/2024     01/25/2024    K 4.8 01/25/2024     01/25/2024    CREATININE 0.75 01/25/2024    BUN 10 01/25/2024    CO2 29 01/25/2024    TSH 0.76 01/25/2024    INR 1.1 11/08/2020    HGBA1C 5.9 01/27/2025       BI mammo bilateral screening tomosynthesis  Narrative: Interpreted By:  Kinsey Baez,   STUDY:  BI MAMMO BILATERAL SCREENING TOMOSYNTHESIS;  2/14/2025 7:45 am      ACCESSION NUMBER(S):  [id number]      ORDERING CLINICIAN:  SELF MAMMOGRAM      INDICATION:  Screening. Family history of breast cancer.      ,Z12.31 Encounter for screening mammogram for malignant neoplasm of  breast      COMPARISON:  01/26/2024, 10/13/2022.      FINDINGS:  2D and tomosynthesis images were reviewed at 1 mm slice thickness.      Density:  There are scattered areas of fibroglandular density.      No suspicious masses or calcifications are identified.       Impression: No mammographic evidence of malignancy.      BI-RADS CATEGORY:  BI-RADS Category:  1 Negative.  Recommendation:  Annual Screening.  Recommended Date:  1 Year.  Laterality:  Bilateral.              For any future breast imaging appointments, please call [phone] ([phone]).          MACRO:  None      Signed by: Kinsey Baez 2/16/2025 4:26 PM  Dictation workstation:   [id number]      Assessment/Plan   Problem List Items Addressed This Visit    None  Visit Diagnoses         Codes    Need for prophylactic vaccination against Streptococcus pneumoniae (pneumococcus)    -  Primary Z23    Relevant Orders    Pneumococcal conjugate vaccine, 20-valent (PREVNAR 20)    Health care maintenance     Z00.00    Relevant Orders    CBC    Comprehensive metabolic panel    Lipid Panel    TSH with reflex to Free T4 if abnormal    Albumin-Creatinine Ratio, Urine Random          Continue current medications.  Significant weight reduction on current medications.  She is happy with the results.  No pain of the joints.      Dear David Manrique     It was my pleasure to take care of you today in the office. Below are the things we discussed today:    1. 1. Immunizations: Yearly Flu shot is recommended.   Pneumonia vaccine today    2. Blood Work: Ordered  3. Seen your dentist twice a year  4. Yearly Eye exam is recommended    5. BMI: 31.6  6: Diet recommendations:   Eat Clean, Try to have as many home cooked meals as possible  Avoid processed foods which contain excess calories, sugar, and sodium.    7. Exercise recommendations:   150 minutes a week to maintain your weight     If you have to loose weight, you need a better diet and exercise plan.     8. Supplements recommended:  a - Calcium 600 mg up to twice a day to get a total of 1200 mg. Each 8 oz of milk or yogurt or 1 oz of cheese, 1 Banana, 1 serving of green Leafy vegetable has about 300 mg of Calcium, so you may subtract that amount. Calcium citrate is the only  acceptable supplement to take if you take an acid suppressing medication like Prilosec; otherwise Calcium carbonate is acceptable too (It can cause Constipation).   b - Vitamin D - 2000 IU daily     9. Please get your Living will / Advance directive completed if you do not have one already. Please make sure our office has a copy of the latest one.     10. Colonoscopy: Uptodate  11. Mammogram: Uptodate   12. PAP: Follow-up with gynecology  13. DEXA: N/A  14: Skin Check: Please see Dermatology once a year for a Skin Check.     15.  Follow-up annually or as needed    Follow up in one year for a Physical. Please call the office before your Physical to see if you need blood work completed prior to your physical.     Please call me if any questions arise from now until your next visit. I will call you after I am done seeing patients. A Doctor is always available by phone when the office is closed. Please feel free to call for help with any problem that you feel shouldn't wait until the office re-opens.     Chidi Castañeda, DO

## 2025-04-07 DIAGNOSIS — Z78.0 MENOPAUSE: ICD-10-CM

## 2025-04-07 RX ORDER — ESTRADIOL 1 MG/1
1 TABLET ORAL DAILY
Qty: 90 TABLET | Refills: 3 | Status: SHIPPED | OUTPATIENT
Start: 2025-04-07 | End: 2025-04-10 | Stop reason: SDUPTHER

## 2025-04-10 ENCOUNTER — OFFICE VISIT (OUTPATIENT)
Dept: OBSTETRICS AND GYNECOLOGY | Facility: CLINIC | Age: 50
End: 2025-04-10
Payer: COMMERCIAL

## 2025-04-10 VITALS
WEIGHT: 183 LBS | HEIGHT: 64 IN | SYSTOLIC BLOOD PRESSURE: 118 MMHG | BODY MASS INDEX: 31.24 KG/M2 | DIASTOLIC BLOOD PRESSURE: 74 MMHG

## 2025-04-10 DIAGNOSIS — Z78.0 MENOPAUSE: ICD-10-CM

## 2025-04-10 DIAGNOSIS — Z01.419 ENCOUNTER FOR ANNUAL ROUTINE GYNECOLOGICAL EXAMINATION: Primary | ICD-10-CM

## 2025-04-10 DIAGNOSIS — N76.0 ACUTE VAGINITIS: ICD-10-CM

## 2025-04-10 LAB
POC BLOOD, URINE: NEGATIVE
POC GLUCOSE, URINE: NEGATIVE MG/DL
POC KETONES, URINE: NEGATIVE MG/DL
POC LEUKOCYTES, URINE: NEGATIVE
POC NITRITE,URINE: NEGATIVE
POC PROTEIN, URINE: NEGATIVE MG/DL

## 2025-04-10 PROCEDURE — 99396 PREV VISIT EST AGE 40-64: CPT | Performed by: OBSTETRICS & GYNECOLOGY

## 2025-04-10 PROCEDURE — 3008F BODY MASS INDEX DOCD: CPT | Performed by: OBSTETRICS & GYNECOLOGY

## 2025-04-10 PROCEDURE — 81003 URINALYSIS AUTO W/O SCOPE: CPT | Performed by: OBSTETRICS & GYNECOLOGY

## 2025-04-10 RX ORDER — ESTRADIOL 1 MG/1
1 TABLET ORAL DAILY
Qty: 90 TABLET | Refills: 3 | Status: SHIPPED | OUTPATIENT
Start: 2025-04-10 | End: 2026-04-10

## 2025-04-10 RX ORDER — ESTRADIOL 1 MG/1
1 TABLET ORAL DAILY
Qty: 90 TABLET | Refills: 3 | OUTPATIENT
Start: 2025-04-10

## 2025-04-10 SDOH — ECONOMIC STABILITY: TRANSPORTATION INSECURITY
IN THE PAST 12 MONTHS, HAS THE LACK OF TRANSPORTATION KEPT YOU FROM MEDICAL APPOINTMENTS OR FROM GETTING MEDICATIONS?: NO

## 2025-04-10 SDOH — ECONOMIC STABILITY: TRANSPORTATION INSECURITY
IN THE PAST 12 MONTHS, HAS LACK OF TRANSPORTATION KEPT YOU FROM MEETINGS, WORK, OR FROM GETTING THINGS NEEDED FOR DAILY LIVING?: NO

## 2025-04-10 ASSESSMENT — SOCIAL DETERMINANTS OF HEALTH (SDOH)
WITHIN THE LAST YEAR, HAVE YOU BEEN AFRAID OF YOUR PARTNER OR EX-PARTNER?: NO
HOW HARD IS IT FOR YOU TO PAY FOR THE VERY BASICS LIKE FOOD, HOUSING, MEDICAL CARE, AND HEATING?: NOT VERY HARD
WITHIN THE LAST YEAR, HAVE TO BEEN RAPED OR FORCED TO HAVE ANY KIND OF SEXUAL ACTIVITY BY YOUR PARTNER OR EX-PARTNER?: NO
WITHIN THE LAST YEAR, HAVE YOU BEEN KICKED, HIT, SLAPPED, OR OTHERWISE PHYSICALLY HURT BY YOUR PARTNER OR EX-PARTNER?: NO
WITHIN THE LAST YEAR, HAVE YOU BEEN HUMILIATED OR EMOTIONALLY ABUSED IN OTHER WAYS BY YOUR PARTNER OR EX-PARTNER?: NO
IN THE PAST 12 MONTHS, HAS THE ELECTRIC, GAS, OIL, OR WATER COMPANY THREATENED TO SHUT OFF SERVICE IN YOUR HOME?: NO

## 2025-04-10 ASSESSMENT — ENCOUNTER SYMPTOMS
RESPIRATORY NEGATIVE: 0
EYES NEGATIVE: 0
CONSTITUTIONAL NEGATIVE: 0
DEPRESSION: 0
GASTROINTESTINAL NEGATIVE: 0
PSYCHIATRIC NEGATIVE: 0
ALLERGIC/IMMUNOLOGIC NEGATIVE: 0
MUSCULOSKELETAL NEGATIVE: 0
OCCASIONAL FEELINGS OF UNSTEADINESS: 0
CARDIOVASCULAR NEGATIVE: 0
NEUROLOGICAL NEGATIVE: 0
HEMATOLOGIC/LYMPHATIC NEGATIVE: 0
ENDOCRINE NEGATIVE: 0
LOSS OF SENSATION IN FEET: 0

## 2025-04-10 ASSESSMENT — PAIN SCALES - GENERAL: PAINLEVEL_OUTOF10: 0-NO PAIN

## 2025-04-10 NOTE — PROGRESS NOTES
Subjective   Patient ID: David Manrique is a 49 y.o. female who presents for Annual Exam (Patient has no pain or falls, no complaints or concerns, last pap 22 wnl HPV negative last mammogram 25 benign no chaperone needed).  HPI  Patient is a 49-year-old female  3 para 2 known to the practice here for her annual exam.  She had a previous hysterectomy by Dr. De La Garza and retains only her ovaries.  She is very happy with the estrogen replacement therapy.  Hot flashes improved sleep is improved.  Vaginal health is improved.    She denies any urinary or bowel symptoms.    She is using a sleep apnea machine and happy with those results.  Also on medication and has lost 10 pounds.  Review of Systems   All other systems reviewed and are negative.      Objective   Physical Exam  Thyroid: No thyroid megaly    Cardiovascular: Regular rate and rhythm    Lungs: Clear to auscultation    Breasts: No skin changes no masses palpated    Abdomen: Soft nontender bowel sounds positive no masses palpated    Extremities nontender no edema    Pelvic exam: External genitalia Bartholin's urethra and [place]'s are normal.  Vaginal exam shows no lesions or discharge.  Pelvic bimanual exam reveals no masses or tenderness.  Assessment/Plan   On estrogen replacement therapy and previous hysterectomy.  Tolerating well and happy with the results and would like to continue    Pap smears are completed    Mammogram done earlier this year from primary care and benign         Elie Stokes MD 04/10/25 8:36 AM

## 2025-04-17 ENCOUNTER — TELEPHONE (OUTPATIENT)
Dept: OBSTETRICS AND GYNECOLOGY | Facility: HOSPITAL | Age: 50
End: 2025-04-17
Payer: COMMERCIAL

## 2025-04-17 NOTE — TELEPHONE ENCOUNTER
Patient verified name and date of birth at start of call. Nurse made patient aware that pharmacy states that her estradiol medication will be available 5/11/2025 as she received a 90 day supply on 2/11/2025. Patient verbalized understanding and had no further questions, comments or concerns at this time.    FLORENTIN Madison

## 2025-04-29 ENCOUNTER — APPOINTMENT (OUTPATIENT)
Dept: ENDOCRINOLOGY | Facility: CLINIC | Age: 50
End: 2025-04-29
Payer: COMMERCIAL

## 2025-04-29 VITALS — HEIGHT: 64 IN | WEIGHT: 179 LBS | BODY MASS INDEX: 30.56 KG/M2

## 2025-04-29 DIAGNOSIS — Z71.3 DIETARY COUNSELING: ICD-10-CM

## 2025-04-29 PROCEDURE — 97803 MED NUTRITION INDIV SUBSEQ: CPT | Performed by: DIETITIAN, REGISTERED

## 2025-04-29 NOTE — PATIENT INSTRUCTIONS
- Continue to incorporate healthy eating following the Mediterranean Diet eating guidelines.   - Please aim for a source of healthy protein and fiber rich foods at meals as reviewed again today for nutrition needs as well as to help provide better satiety at meals.   - Aim for 20-30 grams of protein at main meals as well as a good source of protein during your snack. Ideas we discussed includes eggs, chicken, fish, turkey, Greek yogurt, Fairlife milk and your Premier protein drinks.  - Continue to pre-plan healthy meals for the week.   - Aim for 64 ounces of water daily.  - Keep up the great work with your weekly exercise.  - Follow-up as scheduled for the group classes with Dr. Sammy Fontenot.

## 2025-04-29 NOTE — PROGRESS NOTES
Follow-up Nutrition Assessment    Interval History: Patient presents for follow-up nutrition appointment for weight management/desire to lose weight. Since last nutrition visit on 3/11/25, pt with a 9 lbs weight loss. Taking medication Zepbound with positive appetite suppression. Diet recall reveals two meals daily and one snack. Some breakfast meal not adequate in protein and pt would likely benefit from adding in protein consistently at meals to assist in achieving goals. Has increased daily water intakes and averaging 64 ounces daily. Maintaining weekly exercise with a . See all interventions/recommendations below as discussed during visit this day.     Nutrition Interventions/Recommendations from last visit scheduled on 3/11/25:  - Please refer to your book entitled: Your Mediterranean Meal Plan, and follow Mediterranean Diet eating guidelines as reviewed.  - The Healthy Plate style of eating can be a helpful tool for incorporating healthy balanced meals in appropriate portions. (Healthy Plate: Start with a 9-inch diameter plate. Fill 1/2 the plate with non-starchy vegetables, 1/4 of the plate with whole grains or starchy vegetables, and 1/4 of the plate with a lean source of protein.   - Please aim for a source of healthy protein and fiber rich foods at meals as discussed for nutrition needs as well as to help provide better satiety at meals.   - Consider pre-planning healthy meals for the week. Refer to your book for both menu and recipe ideas to get you started.  - Incorporate strategies of mindful eating every day. Practice staying in tune with your body's hunger cues and eat only when truly hungry. Avoid emotional eating/eating when not hungry.  - Aim for 48-64 ounces of water daily.  - Keep up the great work with your weekly exercise.  - Follow-up as scheduled for the group classes with Dr. Sammy Fontenot.  - Follow-up with nutrition in April or May.      Adherence to recommendations and  "patient stated goals: Yes    Anthropometrics:  Height:   Ht Readings from Last 1 Encounters:   04/10/25 1.626 m (5' 4\")      Weight:   Wt Readings from Last 10 Encounters:   04/10/25 83 kg (183 lb)   04/04/25 83.5 kg (184 lb)   03/11/25 85.3 kg (188 lb)   03/04/25 84.9 kg (187 lb 3.2 oz)   02/24/25 87.5 kg (193 lb)   02/14/25 86.6 kg (191 lb)   01/27/25 87 kg (191 lb 14.4 oz)   12/05/24 83 kg (183 lb)   11/19/24 85 kg (187 lb 6 oz)   10/07/24 85.3 kg (188 lb)      Current BMI:   BMI Readings from Last 1 Encounters:   04/10/25 31.41 kg/m²        Malnutrition Screening:  Significant Unintentional weight loss: No  Eating less than 75% of usual intake for more than 2 weeks: No  Potential Signs of Inflammation: No    Recommended Malnutrition Diagnosis: No malnutrition identified    Diet Recall-  Breakfast- veggie omelet with 1 slice toast OR oatmeal with nuts and fruit OR higher protein bread and fruit  Lunch OR Dinner- Factor meals   Daily Snacks- fruit and very occasionally a small serving of chips but has cut back  Beverages- water throughout the day (64 ounces)  Physical Activity- PT twice weekly for resistance training and line dancing twice weekly    Other pertinent patient reported Information:  - Still taking medication Zepbound with positive appetite suppression.    Nutrition Diagnosis: Food and nutrition-related knowledge deficit related to lack of recent exposure to information as evidenced by BMI above normative standard for age and gender.     Readiness to Learn:  Cognitive ability: Alert and oriented  Motivation to learn: Interested  Family Support: Unable to assess- family not present  Instruction provided to: Patient  Patient learns best by: Multiple methods  Factors affecting learning: None   Physical limitations affecting learning: None    Education Materials Provided:   None this visit    Nutrition Interventions/Recommendations for 4/29/2025:  - Continue to incorporate healthy eating following the " Mediterranean Diet eating guidelines.   - Please aim for a source of healthy protein and fiber rich foods at meals as reviewed again today for nutrition needs as well as to help provide better satiety at meals.   - Aim for 20-30 grams of protein at main meals as well as a good source of protein during your snack. Ideas we discussed includes eggs, chicken, fish, turkey, Greek yogurt, Fairlife milk and your Premier protein drinks.  - Continue to pre-plan healthy meals for the week.   - Aim for 64 ounces of water daily.  - Keep up the great work with your weekly exercise.  - Follow-up as scheduled for the group classes with Dr. Sammy Fontenot.    Nutrition Monitoring & Evaluation: adherence to recommendations and patient stated goals    Need for follow-up: As scheduled for Dr. Marroquin's Shared Medical Appointment (SMA)    Referred by: Dr. Sammy Fontenot    MNT Billing Type: Medical Nutrition Re-Assessment, each 15 min increment, for 2 increments.    SIGNATURE:   Katiuska Sommer RD, HETAL, LD, CDCES                                                          DATE:   4/29/2025

## 2025-05-05 PROCEDURE — RXMED WILLOW AMBULATORY MEDICATION CHARGE

## 2025-05-13 ENCOUNTER — PHARMACY VISIT (OUTPATIENT)
Dept: PHARMACY | Facility: CLINIC | Age: 50
End: 2025-05-13
Payer: MEDICARE

## 2025-06-06 DIAGNOSIS — E66.811 OBESITY (BMI 30.0-34.9): ICD-10-CM

## 2025-06-06 DIAGNOSIS — G47.33 OBSTRUCTIVE SLEEP APNEA: ICD-10-CM

## 2025-06-09 NOTE — PROGRESS NOTES
Subjective  David Manrique is a 49 y.o. female with a hx of RYAN, Erb's palsy, depression, pre-DM, h/o gest diabetes, snoring, hysterectomy, tonsillectomy who presents for weight management and obesity.    Virtual:  Current Plan  1. Nutrition: still working on increasing water intake. Making small changes.      2. Sleep: Has RYAN- uses Cpap       3. Stress: Stable     4. Exercise: line dances 2 days a week, weight training 2 days a week      5. Appetite control: Stable  Obesity medication: Zepbound- 5mg weekly- will start 7.5mg next week      6. Prior Goals: Partially Met     New Goals: Try to add a vegetable to each meal    Weight trend:    Wt Readings from Last 10 Encounters:   04/29/25 81.2 kg (179 lb)   04/10/25 83 kg (183 lb)   04/04/25 83.5 kg (184 lb)   03/11/25 85.3 kg (188 lb)   03/04/25 84.9 kg (187 lb 3.2 oz)   02/24/25 87.5 kg (193 lb)   02/14/25 86.6 kg (191 lb)   01/27/25 87 kg (191 lb 14.4 oz)   12/05/24 83 kg (183 lb)   11/19/24 85 kg (187 lb 6 oz)       Current Medications[1]    ROS:  System: normal  Eyes : no visual changes  Neck : no tenderness, no new lumps/bumps  Respiratory : no SOB  Cardiovascular : no chest pain, no palpitations  Gastro-Intestinal : no abdominal concerns  Neurological : no numbness or tingling in the extremities  Musculoskeletal : no joint paint, no muscle pain  Skin : no unusual rashes  Psychiatric : no depression, no anxiety  See HPI for Endocrine ROS    Medical History[2]    Surgical History[3]    Social History     Socioeconomic History    Marital status:      Spouse name: Not on file    Number of children: Not on file    Years of education: Not on file    Highest education level: Not on file   Occupational History    Not on file   Tobacco Use    Smoking status: Never    Smokeless tobacco: Never   Substance and Sexual Activity    Alcohol use: Yes     Alcohol/week: 1.0 standard drink of alcohol     Types: 1 Glasses of wine per week    Drug use: Never    Sexual  activity: Yes     Partners: Male     Birth control/protection: None   Other Topics Concern    Not on file   Social History Narrative    Not on file     Social Drivers of Health     Financial Resource Strain: Low Risk  (4/10/2025)    Overall Financial Resource Strain (CARDIA)     Difficulty of Paying Living Expenses: Not very hard   Food Insecurity: No Food Insecurity (11/19/2024)    Hunger Vital Sign     Worried About Running Out of Food in the Last Year: Never true     Ran Out of Food in the Last Year: Never true   Transportation Needs: No Transportation Needs (4/10/2025)    PRAPARE - Transportation     Lack of Transportation (Medical): No     Lack of Transportation (Non-Medical): No   Physical Activity: Not on file   Stress: No Stress Concern Present (1/18/2024)    [employer] - Occupational Stress Questionnaire     Feeling of Stress : Not at all   Social Connections: Not on file   Intimate Partner Violence: Not At Risk (4/10/2025)    Humiliation, Afraid, Rape, and Kick questionnaire     Fear of Current or Ex-Partner: No     Emotionally Abused: No     Physically Abused: No     Sexually Abused: No   Housing Stability: Not on file       Objective      Physical Exam:  There were no vitals taken for this visit.  General : alert and oriented X3, no acute distress  Eyes : EOMI   BMI: 30.21kg/m2    Assessment/Plan  David Manrique is a 49 y.o. female with a hx of RYAN, Erb's palsy, depression, pre-DM, h/o gest diabetes, snoring, hysterectomy, tonsillectomy who presents for weight management and obesity.     1. Weight Management : Reviewed the principles of energy metabolism, caloric intake and expenditure, and rationale for a treatment program.  Also reinforced need for reduced calorie, low fat diet and increased physical activity.     We reviewed the possibility of starting an interdisciplinary lifestyle intervention program involving improvement of the diet, a personalized exercise  program, efforts to reduce the stress and the possibility of using appetite suppressant medications in an effort to help with the weight loss process.  The patient expressed interest in the plan.     2. Nutrition : I discussed trying one of the diet approaches we have here in the program : Mediterranean lifestyle, ketosis diet.  The patient will be referred to the Registered Dietician for education on their diet of choice.     1/27/25: 191.9lb, 32.94kg/m2  3/4/25: 187.2lb, 32.13kg/m2  6/10/25: 176lb, 30.21kg/m2     3. Sleep: RYAN on CPAP     4. Stress: , , 8 kids, 5 grandchildren      5. Exercise: Incorporating consistently- weight training 2 days a week      6. Appetite control: high      Discussed Obesity medication: discussed AOM's  1/27/25: [id number]=5.9%  On Zepbound 5mg/wk --> will up to 7.5mg/wk.     7. Goal: to eat protein and a veggie with each meal.     Follow up in a group visit.  Revro Fontenot MD            [1]   Current Outpatient Medications:     estradiol (Estrace) 1 mg tablet, Take 1 tablet (1 mg) by mouth once daily., Disp: 90 tablet, Rfl: 3    tirzepatide, weight loss, (Zepbound) 5 mg/0.5 mL injection, Inject 5 mg under the skin every 7 days., Disp: 4 each, Rfl: 2    tirzepatide, weight loss, (Zepbound) 7.5 mg/0.5 mL injection, Inject 7.5 mg under the skin every 7 days., Disp: 4 each, Rfl: 2  [2]   Past Medical History:  Diagnosis Date    Allergic 11/23/1980    Personal history of gestational diabetes 01/19/2017    History of gestational diabetes mellitus (GDM)    Snoring     Snoring    Vitamin D deficiency    [3]   Past Surgical History:  Procedure Laterality Date    HYSTERECTOMY  01/19/2017    Hysterectomy    OTHER SURGICAL HISTORY  01/10/2014    History Of Prior Surgery    TONSILLECTOMY  01/19/2017    Tonsillectomy

## 2025-06-10 ENCOUNTER — APPOINTMENT (OUTPATIENT)
Dept: ENDOCRINOLOGY | Facility: CLINIC | Age: 50
End: 2025-06-10
Payer: COMMERCIAL

## 2025-06-10 ENCOUNTER — OFFICE VISIT (OUTPATIENT)
Dept: ENDOCRINOLOGY | Facility: CLINIC | Age: 50
End: 2025-06-10
Payer: COMMERCIAL

## 2025-06-10 VITALS — WEIGHT: 176 LBS | BODY MASS INDEX: 30.05 KG/M2 | HEIGHT: 64 IN

## 2025-06-10 DIAGNOSIS — E66.811 CLASS 1 OBESITY: Primary | ICD-10-CM

## 2025-06-10 DIAGNOSIS — Z76.89 ENCOUNTER FOR WEIGHT MANAGEMENT: ICD-10-CM

## 2025-06-10 PROCEDURE — 99215 OFFICE O/P EST HI 40 MIN: CPT | Performed by: INTERNAL MEDICINE

## 2025-06-10 PROCEDURE — 3008F BODY MASS INDEX DOCD: CPT | Performed by: INTERNAL MEDICINE

## 2025-08-06 DIAGNOSIS — B37.9 YEAST INFECTION: Primary | ICD-10-CM

## 2025-08-06 RX ORDER — FLUCONAZOLE 150 MG/1
150 TABLET ORAL ONCE
Qty: 1 TABLET | Refills: 0 | Status: SHIPPED | OUTPATIENT
Start: 2025-08-06 | End: 2025-08-06

## 2025-08-12 ENCOUNTER — APPOINTMENT (OUTPATIENT)
Dept: ENDOCRINOLOGY | Facility: CLINIC | Age: 50
End: 2025-08-12
Payer: COMMERCIAL

## 2025-08-12 VITALS — WEIGHT: 174 LBS | BODY MASS INDEX: 29.71 KG/M2 | HEIGHT: 64 IN

## 2025-08-12 DIAGNOSIS — E66.3 OVERWEIGHT: Primary | ICD-10-CM

## 2025-08-12 DIAGNOSIS — Z76.89 ENCOUNTER FOR WEIGHT MANAGEMENT: ICD-10-CM

## 2025-08-12 PROCEDURE — 99215 OFFICE O/P EST HI 40 MIN: CPT | Performed by: INTERNAL MEDICINE

## 2025-08-12 PROCEDURE — 3008F BODY MASS INDEX DOCD: CPT | Performed by: INTERNAL MEDICINE

## 2025-09-02 DIAGNOSIS — G47.33 OBSTRUCTIVE SLEEP APNEA: ICD-10-CM

## 2025-09-02 DIAGNOSIS — E66.811 OBESITY (BMI 30.0-34.9): ICD-10-CM

## 2025-09-10 ENCOUNTER — APPOINTMENT (OUTPATIENT)
Dept: ENDOCRINOLOGY | Facility: CLINIC | Age: 50
End: 2025-09-10
Payer: COMMERCIAL

## 2025-10-01 ENCOUNTER — APPOINTMENT (OUTPATIENT)
Dept: ENDOCRINOLOGY | Facility: CLINIC | Age: 50
End: 2025-10-01
Payer: COMMERCIAL

## 2025-10-14 ENCOUNTER — APPOINTMENT (OUTPATIENT)
Dept: ENDOCRINOLOGY | Facility: CLINIC | Age: 50
End: 2025-10-14
Payer: COMMERCIAL

## 2025-11-12 ENCOUNTER — APPOINTMENT (OUTPATIENT)
Dept: ENDOCRINOLOGY | Facility: CLINIC | Age: 50
End: 2025-11-12
Payer: COMMERCIAL

## 2025-12-10 ENCOUNTER — APPOINTMENT (OUTPATIENT)
Dept: ENDOCRINOLOGY | Facility: CLINIC | Age: 50
End: 2025-12-10
Payer: COMMERCIAL

## 2026-03-02 ENCOUNTER — APPOINTMENT (OUTPATIENT)
Dept: SLEEP MEDICINE | Facility: CLINIC | Age: 51
End: 2026-03-02
Payer: COMMERCIAL

## 2026-04-16 ENCOUNTER — APPOINTMENT (OUTPATIENT)
Dept: OBSTETRICS AND GYNECOLOGY | Facility: CLINIC | Age: 51
End: 2026-04-16
Payer: COMMERCIAL